# Patient Record
Sex: MALE | Race: WHITE
[De-identification: names, ages, dates, MRNs, and addresses within clinical notes are randomized per-mention and may not be internally consistent; named-entity substitution may affect disease eponyms.]

---

## 2020-12-02 NOTE — HISTORY AND PHYSICAL
DATE OF SERVICE:  



ADMISSION HISTORY AND PHYSICAL



This will be for outpatient surgery on 12/09/2020 for left knee arthroscopy.



HISTORY OF PRESENT ILLNESS:

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  He fell on

his flexed knee.  He has had grinding, popping and catching in his knee since

the time of his fall as well as pain and swelling due to functional impairment

and failure to improve with conservative measures.  The patient elected to

proceed with surgical intervention.



REVIEW OF SYSTEMS:

No chest pain, no shortness of breath, no dysuria.



PAST MEDICAL HISTORY:

Lumbar spine, depression, kidney stones, cardiomyopathy, obstructive sleep

apnea, neuropathy.



PAST SURGICAL HISTORY:

ORIF left elbow, gastric bypass, kidney stone removal, cholecystectomy.



FAMILY HISTORY:

Significant for hypertension, congestive heart failure, COPD, Crohn disease,

rheumatoid arthritis, diabetes.



PRIMARY CARE PROVIDER:

[facility].



MEDICATIONS:

Lisinopril, furosemide, Cymbalta, pantoprazole, Vraylar, hydrocodone, Celebrex,

Lasix, Protonix, amitriptyline, Neurontin.



ALLERGIES:

____.



SOCIAL HISTORY:

The patient denies alcohol, tobacco use.



RADIOGRAPHS:

Reveal some diffuse joint space narrowing, patellofemoral joint space narrowing

as well.



PHYSICAL EXAMINATION:

GENERAL:  The patient is well-developed, well-nourished, in no acute distress.

HEENT:  Normocephalic, atraumatic.  Pupils are equal, round, reactive to light. 

Oropharynx is clear.

NECK:  Supple, no lymphadenopathy.

LUNGS:  Clear to auscultation bilaterally.

HEART:  Regular rate and rhythm.

ABDOMEN:  Soft, nontender, nondistended.

EXTREMITIES:  The left knee demonstrates mild effusion.  He has pain with

patellar loading, tenderness along his posterior joint line with flexion. 

Negative anterior and posterior drawer.  No varus valgus laxity.  He is tender

along his medial joint lines as well.  Crepitus noted with patellar loading.



IMPRESSION:

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



PLAN:

Left knee arthroscopy, chondroplasty and partial meniscectomy.  Risks, benefits,

options, ramifications and recovery have been discussed at length with the

patient.  He understands and wishes to proceed.





Job ID: [id number]

DocumentID: [id number]

Dictated Date:  11/30/2020 12:28:52

Transcription Date: 11/30/2020 12:52:36

Dictated By: GRISEL SILVA MD

## 2020-12-07 ENCOUNTER — HOSPITAL ENCOUNTER (OUTPATIENT)
Dept: HOSPITAL 75 - PREOP | Age: 50
Discharge: HOME | End: 2020-12-07
Attending: ORTHOPAEDIC SURGERY
Payer: COMMERCIAL

## 2020-12-07 VITALS — HEIGHT: 77.95 IN | BODY MASS INDEX: 36.45 KG/M2 | WEIGHT: 315 LBS

## 2020-12-07 DIAGNOSIS — Z20.828: ICD-10-CM

## 2020-12-07 DIAGNOSIS — Z01.812: Primary | ICD-10-CM

## 2020-12-07 DIAGNOSIS — S83.242A: ICD-10-CM

## 2020-12-07 PROCEDURE — 87635 SARS-COV-2 COVID-19 AMP PRB: CPT

## 2020-12-09 ENCOUNTER — HOSPITAL ENCOUNTER (OUTPATIENT)
Dept: HOSPITAL 75 - SDC | Age: 50
End: 2020-12-09
Attending: ORTHOPAEDIC SURGERY
Payer: COMMERCIAL

## 2020-12-09 VITALS — DIASTOLIC BLOOD PRESSURE: 81 MMHG | SYSTOLIC BLOOD PRESSURE: 129 MMHG

## 2020-12-09 VITALS — SYSTOLIC BLOOD PRESSURE: 129 MMHG | DIASTOLIC BLOOD PRESSURE: 79 MMHG

## 2020-12-09 VITALS — DIASTOLIC BLOOD PRESSURE: 86 MMHG | SYSTOLIC BLOOD PRESSURE: 133 MMHG

## 2020-12-09 VITALS — SYSTOLIC BLOOD PRESSURE: 135 MMHG | DIASTOLIC BLOOD PRESSURE: 86 MMHG

## 2020-12-09 VITALS — WEIGHT: 315 LBS | BODY MASS INDEX: 36.45 KG/M2 | HEIGHT: 77.95 IN

## 2020-12-09 VITALS — SYSTOLIC BLOOD PRESSURE: 136 MMHG | DIASTOLIC BLOOD PRESSURE: 79 MMHG

## 2020-12-09 VITALS — SYSTOLIC BLOOD PRESSURE: 135 MMHG | DIASTOLIC BLOOD PRESSURE: 74 MMHG

## 2020-12-09 VITALS — DIASTOLIC BLOOD PRESSURE: 86 MMHG | SYSTOLIC BLOOD PRESSURE: 134 MMHG

## 2020-12-09 VITALS — SYSTOLIC BLOOD PRESSURE: 135 MMHG | DIASTOLIC BLOOD PRESSURE: 80 MMHG

## 2020-12-09 VITALS — DIASTOLIC BLOOD PRESSURE: 79 MMHG | SYSTOLIC BLOOD PRESSURE: 136 MMHG

## 2020-12-09 VITALS — DIASTOLIC BLOOD PRESSURE: 86 MMHG | SYSTOLIC BLOOD PRESSURE: 131 MMHG

## 2020-12-09 VITALS — DIASTOLIC BLOOD PRESSURE: 83 MMHG | SYSTOLIC BLOOD PRESSURE: 141 MMHG

## 2020-12-09 DIAGNOSIS — F32.9: ICD-10-CM

## 2020-12-09 DIAGNOSIS — G47.33: ICD-10-CM

## 2020-12-09 DIAGNOSIS — S83.242A: Primary | ICD-10-CM

## 2020-12-09 DIAGNOSIS — M06.9: ICD-10-CM

## 2020-12-09 DIAGNOSIS — Z79.899: ICD-10-CM

## 2020-12-09 DIAGNOSIS — Z88.8: ICD-10-CM

## 2020-12-09 DIAGNOSIS — I10: ICD-10-CM

## 2020-12-09 DIAGNOSIS — G62.9: ICD-10-CM

## 2020-12-09 DIAGNOSIS — M94.262: ICD-10-CM

## 2020-12-09 PROCEDURE — 87081 CULTURE SCREEN ONLY: CPT

## 2020-12-09 NOTE — PHYSICAL THERAPY ORTHO EVAL
PT Orthopedic Evaluation


Type of Surgery


Knee Scope


left





Prior Level of Function


Current Living Status:  Spouse


Locomotion     (Upon Admit):  Independent


Established Durable Medical Eq:  Crutches





Subjective


Subjective


Patient in bed pre tx, agrees to PT, has no complaints of pain.  No numbness in 

left leg


Entry Into Home:  Stairs With Railing





Motor Control


Motor Control:  Motor Control WNL





ROM


left knee extensin +2 degrees, flexion 90 degrees





Transfer


SCALE: Activities may be completed with or without assistive devices.





6-Indepedent-patient completes the activity by him/herself with no assistance 

from a helper.


5-Set-up or Clean-up Assistance-helper sets up or cleans up; patient completes 

activity. [place] assists only prior to or  


    following the activity.


4-Supervision or Touching Assistance-helper provides verbal cues and/or 

touching/steadying and/or contact guard assistance as patient completes 

activity. Assistance may be provided   


    throughout the activity or intermittently.


3-Partial/Moderate Assistance-helper does LESS THAN HALF the effort. [place] 

lifts, holds or supports trunk or limbs, but provides less than half the effort.


2-Substantial/Maximal Assistance-helper does MORE THAN HALF the effort. [place] 

lifts or holds trunk or limbs and provides more than half the effort.


8-Cvhodzoyc-ugtxqm does ALL the effort. Patient does none of the effort to 

complete the activity. Or, the assistance of 2 or more helpers is required for 

the patient to complete the  


    activity.


If activity was not attempted, code reason:


7-Patient Refused.


9-Not Applicable-not attempted and the patient did not perform the activity 

before the current illness, exacerbation or injury.


10-Not Attempted due to Environmental Limitations-(lack of equipment, weather 

restraints, etc.).


88-Not Attempted due to Medical Conditions or Safety Concerns.


Transfers (B, C, W/C) (QC):  4





Gait


Summary/Comments


Patient ambulated 125' with a rolling walker with SBA, went up and down 1 step 

using a rolling walker with CGA, cues for foot placement.  Patient has steady 

ambulation





Treatment Rendered


Treatment:  Therapeutic Exercises, Gait Train, Step Train


Exercise Instruction:  Quad Sets, Heel Slides, Ankle Pumps





Assessment/Goals


Goal Time Frame:  1 Visit


Understands HEP:  Yes


Safe Ambulation:  Yes





Plan


Treatment Plan:  Discharge


PT/Family Agrees to Plan:  Yes





Time


Time In:  1155


Time Out:  1206


Total Billed Treatment Time:  11


Billed Treatment Time


1 visit


EVL 11'











JACKIE QUEZADA PT                 Dec 9, 2020 13:16

## 2020-12-09 NOTE — PROGRESS NOTE-PRE OPERATIVE
Pre-Operative Progress Note


H&P Reviewed


The H&P was reviewed, patient examined and no changes noted.


Date Seen by Provider:  Dec 9, 2020


Time Seen by Provider:  07:46


Date H&P Reviewed:  Dec 9, 2020


Time H&P Reviewed:  07:46


Pre-Operative Diagnosis:  left knee medial meniscus tear and chondromalacia











GRISEL SILVA MD             Dec 9, 2020 07:47

## 2020-12-09 NOTE — ANESTHESIA-GENERAL POST-OP
General


Patient Condition


Mental Status/LOC:  Same as Preop


Cardiovascular:  Satisfactory


Nausea/Vomiting:  Absent


Respiratory:  Satisfactory


Pain:  Controlled


Complications:  Absent





Post Op Complications


Complications


None





Follow Up Care/Instructions


Patient Instructions


None needed.





Anesthesia/Patient Condition


Patient Condition


Patient is doing well, no complaints, stable vital signs, no apparent adverse 

anesthesia problems.











KARSTEN DELATORRE DO          Dec 9, 2020 10:49

## 2020-12-09 NOTE — PROGRESS NOTE-POST OPERATIVE
Post-Operative Progess Note


Surgeon (s)/Assistant (s)


Surgeon


GRISEL SILVA MD


Assistant:  Tom Simon





Pre-Operative Diagnosis


left knee medial meniscus tear and chondromalacia





Post-Operative Diagnosis





left knee medial meniscus tear and chondromalacia of the medial and


lateral femoral condyles and trochlea





Procedure & Operative Findings


Date of Procedure


12/9/20


Procedure Performed/Findings


left knee arthroscopic partial medial meniscectomy and chondroplasty of the medi

al and lateral femoral condyles and trochlea


Anesthesia Type


GETA





Estimated Blood Loss


Estimated blood loss (mL):  minimal





Specimens/Packing


Specimens Removed


none


Packing:  


none











GRISEL SILVA MD             Dec 9, 2020 07:48

## 2020-12-09 NOTE — OPERATIVE REPORT
DATE OF SERVICE:  



PREOPERATIVE DIAGNOSES:

1.  Left knee medial meniscus tear.

2.  Left knee chondromalacia of the medial femoral condyle.



POSTOPERATIVE DIAGNOSES:

1.  Left knee medial meniscus tear.

2.  Left knee chondromalacia of the medial femoral condyle.

3.  Left knee chondromalacia of the lateral femoral condyle.

4.  Left knee chondromalacia of the trochlea.



PROCEDURES PERFORMED:

1.  Left knee arthroscopic partial medial meniscectomy.

2.  Left knee arthroscopic chondroplasty of the medial femoral condyle.

3.  Left knee arthroscopic chondroplasty of the lateral femoral condyle.

4.  Left knee arthroscopic chondroplasty of the trochlea.



SURGEON:

Andrew Silva MD.



ASSISTANT:

Tom Simon, who assisted throughout the procedure and closed the incisions.



ANESTHESIA:

General endotracheal by Dr. Timmons.



TOURNIQUET TIME:

Not applicable.



ESTIMATED BLOOD LOSS:

Minimal.



DRAINS:

None.



COMPLICATIONS:

None.



POSTOPERATIVE PLAN:

Routine arthroscopy protocol.  The patient was transferred to the recovery room

awake and in stable condition.



STATEMENT OF MEDICAL NECESSITY:

The patient is a 50-year-old gentleman with complaints of left medial knee pain,

catching, locking and swelling.  He had tenderness along the medial joint line

and pain medially with Vitaliy's.  He also had some joint space narrowing noted

radiographically.  Due to functional impairment and failure to improve with

conservative measures, the patient elected to proceed with surgical

intervention.  Examination under anesthesia revealed range of motion 0/0/130

with negative Lachman, negative anterior and posterior drawer.  No varus or

valgus laxity, negative pivot shift.  Arthroscopic findings of the patella

demonstrated grade IV chondral loss centrally in a 15 x 20 area with no unstable

chondral flaps were noted otherwise.  The trochlea demonstrated grade II

chondral flap centrally in a 10 x 10 area.  The medial and lateral gutters were

clear.  The ACL and PCL were intact.  The lateral compartment demonstrated grade

II to III chondral flaps of the central portion of the femoral condyle in a 10 x

12 area.  The medial compartment demonstrated a flap tear of the posterior

horn/body junction involving approximately 20% of the junction of the meniscus

and there were grade II to III chondral flaps of the central portion of the

femoral condyle in a 10 x 12 area.



PROCEDURE IN DETAIL:

After the risks and benefits of the procedure were discussed and questions were

answered, informed consent was signed and placed on the chart, the operative

site was confirmed in the preoperative holding area initialed by the surgeon. 

The patient was then transferred to the operating room and after adequate levels

of general endotracheal anesthetic were obtained, a timeout was called,

confirming the operative site.  Examination under anesthesia was performed with

the above findings noted.  The left lower extremity was prepped and draped in

the usual sterile fashion.  The knee joint was injected with 60 mL of fluid and

a standard inferolateral portal placed with the arthroscope under direct

visualization, inferior medial portal was created.  The menisci and cruciates

were carefully probed with the above findings noted.  The unstable chondral

flaps on the trochlea were debrided with a shaver back to a stable edge.  Scope

was redirected into the lateral compartment and unstable chondral flaps on the

lateral femoral condyle were debrided with the shaver back to a stable edge. 

Scope was redirected into the medial compartment and the unstable chondral flaps

in the medial femoral condyle were debrided with shaver back to a stable edge

and the medial meniscus flap tear was debrided with a shaver back to a stable

edge.  This was carefully probed with no further tearing or instability noted. 

The knee was copiously irrigated.  The port sites were closed with 4-0 nylon in

a simple interrupted fashion.  The knee was injected with Duramorph.  The port

sites were infiltrated with plain Marcaine.  A soft dressing was applied and the

patient was transferred to the recovery room awake and in a stable condition.





Job ID: [id number]

DocumentID: [id number]

Dictated Date:  12/09/2020 10:28:38

Transcription Date: 12/09/2020 18:05:32

Dictated By: ANDREW SILVA MD

## 2021-01-13 ENCOUNTER — HOSPITAL ENCOUNTER (OUTPATIENT)
Dept: HOSPITAL 75 - PREOP | Age: 51
LOS: 90 days | Discharge: HOME | End: 2021-04-13
Attending: ORTHOPAEDIC SURGERY
Payer: COMMERCIAL

## 2021-01-13 DIAGNOSIS — Z01.818: Primary | ICD-10-CM

## 2021-10-27 ENCOUNTER — HOSPITAL ENCOUNTER (EMERGENCY)
Dept: HOSPITAL 75 - ER | Age: 51
Discharge: HOME | End: 2021-10-27
Payer: COMMERCIAL

## 2021-10-27 VITALS — BODY MASS INDEX: 37.58 KG/M2 | WEIGHT: 315 LBS | HEIGHT: 76.77 IN

## 2021-10-27 VITALS — SYSTOLIC BLOOD PRESSURE: 141 MMHG | DIASTOLIC BLOOD PRESSURE: 80 MMHG

## 2021-10-27 DIAGNOSIS — Z79.899: ICD-10-CM

## 2021-10-27 DIAGNOSIS — F32.9: ICD-10-CM

## 2021-10-27 DIAGNOSIS — M54.9: ICD-10-CM

## 2021-10-27 DIAGNOSIS — G89.29: ICD-10-CM

## 2021-10-27 DIAGNOSIS — Z79.891: ICD-10-CM

## 2021-10-27 DIAGNOSIS — M23.91: Primary | ICD-10-CM

## 2021-10-27 PROCEDURE — 73562 X-RAY EXAM OF KNEE 3: CPT

## 2021-10-27 NOTE — XMS REPORT
Encounter Summary

                             Created on: 10/27/2021



Silas Bocanegrajohn Michael

External Reference #: [id number]

: 1970

Sex: Male



Demographics





                          Address                   [address] [place]  [address]

 

                          Home Phone                [phone]

 

                          Preferred Language        English

 

                          Marital Status            

 

                          Confucianism Affiliation     Unknown

 

                          Race                      White

 

                          Ethnic Group              Not  or 





Author





                          Author                    [facility]

 

                          Organization              [facility]

 

                          Address                   Unknown

 

                          Phone                     Unavailable







Support





                Name            Relationship    Address         Phone

 

                    Natalie Bocanegra  [employer]                [address] [place]  [address]                       [phone]







Care Team Providers





                    Care Team Member Name Role                Phone

 

                    Mary Hong Unavailable         [phone]

 

                    Ana Paula Lockwood RN Unavailable         Unavailable

 

                    Nura Mosqueda MD Unavailable         [phone]

 

                    Herbert Medeiros MD    Unavailable         Unavailable

 

                    Olivier Gomez PA-C PCP                 [phone]







Reason for Referral

* Pain Authorization (Routine)



                          Referred By Contact       Referred To Contact



                 Status          Reason          Specialty       Diagnoses /  



                                         Procedures  

 

                                        



Hue Tian APRN-NP



[address]



[address]



[place] [address]



Phone: [phone]



Fax: [phone]                       



[facility]



[address]



[place] [address]



Phone: [phone]



                     Authorized          Anesthesia Pain     Diagnoses  



                                         Lumbar  



                                         radiculopathy

  



                                         P  



                                         rocedures  



                                         [facility]  



                                         INJECT SNRB/TFESI  



                                         LUMBAR/SACRAL  









Electronically signed by Hue SANDERS at 





Reason for Visit

* 



 



                           Reason                    Comments

 

 



                           Follow Up                 fuv on low back pain









Encounter Details





                          Care Team                 Description



                     Date                Type                Department  

 

                                        



Hue Tian APRN-NP



[address]



[address]



[place] [address]



[phone] (Fax)                      Lumbar radiculopathy (Primary Dx); 

Chronic right-sided low back pain without sciatica; 

Medication management; 

S/P epidural steroid injection



                     2021          Office Visit        [facility]  



                                         Med: [facility]  



                                         [facility]: [id number]  



                                         [address]  



                                         [address]  



                                         [place]  



                                         [id number] [phone]  







Social History





                                        Date



                 Tobacco Use     Types           Packs/Day       Years Used 

 

                                         



                     Never Smoker        0                   0 

 

    



                           Smokeless Tobacco:        Chew  



                                         Current User   







                                        Comments: current use of smokless tobacc

o (chew)







                                        Comments



                           Alcohol Use               Standard Drinks/Week 

 

                                         



                           Yes                       8 (1 standard drink = 0.6 o

z pure alcohol) 







  



                     Alcohol Habits      Answer              Date Recorded

 

  



                           How often do you have a drink containing alcohol?  No

t asked 

 

  



                           How many drinks containing alcohol do you have on  No

t asked 



                                         a typical day when you are drinking?  

 

  



                     How often do you have six or more drinks on one  Weekly    

          2020



                                         occasion?  

 

  



                           Comment:                  Not asked 







 



                           Sex Assigned at Birth     Date Recorded

 

 



                           Male                      2020  3:37 PM CDT







                                        Date Recorded



                           COVID-19 Exposure         Response 

 

                                        2021 12:02 PM CDT



                           In the last month, have you been in contact with  No 

/ Unsure 



                                         someone who was confirmed or suspected 

to have  



                                         Coronavirus / COVID-19?  



documented as of this encounter



Last Filed Vital Signs





                    Reading             Time Taken          Comments



                                         Vital Sign   

 

                    128/61              2021 10:15 AM CDT  



                                         Blood Pressure   

 

                    66                  2021 10:15 AM CDT  



                                         Pulse   

 

                    36.7 C (98.1 F) 2021 10:15 AM CDT  



                                         Temperature   

 

                    22                  2021 10:15 AM CDT  



                                         Respiratory Rate   

 

                    100%                2021 10:15 AM CDT  



                                         Oxygen Saturation   

 

                    -                   -                    



                                         Inhaled Oxygen   



                                         Concentration   

 

                    194.6 kg (429 lb)   2021 10:15 AM CDT  



                                         Weight   

 

                    198.1 cm (6' 6")    2021 10:15 AM CDT  



                                         Height   

 

                    49.58               2021 10:15 AM CDT  



                                         Body Mass Index   



documented in this encounter



Functional Status





                                        Date of Assessment



                           Functional Status         Response 

 

                                        2021



                           Does the patient have a hearing impairment:  No 

 

                                        2021



                           Does the patient have a visual impairment:  Yes 

 

                                        2021



                           Does the patient have impaired ambulation:  Yes 

 

                                        2021



                           Does the patient have an activity of daily living  No

 



                                         (ADL) impairment:  

 

                                        2021



                           Does the patient have an instrumental activity of  Ye

s 



                                         daily living (IADL) impairment:  







                                        Date of Assessment



                           Cognitive Status          Response 

 

                                        2021



                           Does the patient have a cognitive impairment:  No 



documented as of this encounter



Ordered Prescriptions





                          Start Date                End Date



                 Prescription    Sig             Dispensed       Refills  

 

                          2021                 



                 HYDROcodone/acetaminophen  Take one        45 tablet       0  



                           (NORCO) 10/325 mg tablet  tablet by    



                                         mouth every 6    



                                         hours as    



                                         needed for    



                                         Pain    

 

                          10/26/2021                10/18/2021



                 HYDROcodone/acetaminophen  Take one        30 tablet       0  



                           (NORCO) 10/325 mg tablet  tablet by    



                                         mouth every 6    



                                         hours as    



                                         needed for    



                                         Pain    

 

                          2021                10/18/2021



                 HYDROcodone/acetaminophen  Take one        30 tablet       0  



                           (NORCO) 10/325 mg tablet  tablet by    



                                         mouth every 6    



                                         hours as    



                                         needed for    



                                         Pain    



documented in this encounter



Progress Notes

* Hue Tian, APRN-NP - 2021 10:00 AM CDT



Formatting of this note is different from the original.



[facility] CL[facility] NOTE

 



SUBJECTIVE: 

Onel Bocanegra is a 51 y.o.-year-old male with history of cardiomyopat
hy and L2-L3 disc extrusion, who presents for scheduled follow up for right-side
d radicular low back pain.  The patient was last seen via telehealth on . At that time, he was provided prescription refills for hydrocodone for 3 chavo
hs as well as recommend continuing with walking program and home exercises.  Ana Maria quesada has previously underwent a right L3 transforaminal epidural steroid injecti
on on 2021.  Patient reports greater than 80% reduction in his pain that la
sted greater than 3 months.  Pain is now back to preprocedural level.  Patient i
s interested in repeating the injection at this time.  He has seen Dr. Rahul aparicio
n  for follow-up visit.  Patient is scheduled to have an EMG completed in Se
ptember.  He still needs to lose additional weight to be considered a surgical c
andidate.  He is scheduled to meet with a nutritionist at his work coming up mandi
n.  He continues to take hydrocodone with functional benefits.  He denies any ad
verse effects.  VAS pain score is rated 7 out of 10 today.  Patient does report 
intermittent right knee buckling with stairs.  He is not currently wearing a bra
ce.  He denies any falls but has had some near misses.  Denies any new overt wea
knesses in the lower extremities.  Denies any loss of control of bowel or bladde
r.  Last MRI lumbar spine was completed in 2020.

 



Review of Systems



Current Outpatient Medications: 

  amitriptyline (ELAVIL) 50 mg tablet, Take  by mouth at bedtime daily., Disp
: , Rfl: 

  celecoxib (CELEBREX) 100 mg capsule, TAKE ONE (1) CAPSULE BY MOUTH TWICE DA
GUILLERMO , Disp: 180 capsule, Rfl: 0

  duloxetine DR (CYMBALTA) 30 mg capsule, Take 30 mg by mouth twice daily., D
isp: , Rfl: 

  furosemide (LASIX) 40 mg tablet, Take 40 mg by mouth daily., Disp: , Rfl: 

  HYDROcodone/acetaminophen (NORCO) 10/325 mg tablet, Take one tablet by mout
h every 6 hours as needed for Pain, Disp: 45 tablet, Rfl: 0

  [START ON 2021] HYDROcodone/acetaminophen (NORCO) 10/325 mg tablet, Ta
ke one tablet by mouth every 6 hours as needed for Pain, Disp: 30 tablet, Rfl: 0

  [START ON 10/26/2021] HYDROcodone/acetaminophen (NORCO) 10/325 mg tablet, T
yaneth one tablet by mouth every 6 hours as needed for Pain, Disp: 30 tablet, Rfl: 
0

  lisinopriL (ZESTRIL) 10 mg tablet, Take  by mouth daily., Disp: , Rfl: 

  pantoprazole DR (PROTONIX) 40 mg tablet, Take 40 mg by mouth daily., Disp: 
, Rfl: 

  pregabalin (LYRICA) 75 mg capsule, , Disp: , Rfl: 

  tiZANidine (ZANAFLEX) 4 mg tablet, Take one-half tablet to one tablet by mo
uth twice daily as needed., Disp: 90 tablet, Rfl: 3

  Verification of Patch Placement and Integrity - Lidocaine 5%, , Disp: , Rfl
: 

  vilazodone (VIIBRYD) 20 mg tablet, Take 40 mg by mouth daily., Disp: , Rfl:


  VRAYLAR 3 mg cap, 1.5 mg. 1.5 MG, Disp: , Rfl: 

Allergies 

Allergen Reactions 

 Carvedilol SEE COMMENTS 



Physical Exam

Vitals: 

 21 1015 

BP: 128/61 

BP Source: Arm, Right Upper 

Patient Position: Sitting 

Pulse: 66 

Resp: 22 

Temp: 36.7 C (98.1 F) 

TempSrc: Oral 

SpO2: 100% 

Weight: (!) 194.6 kg (429 lb) 

Height: 198.1 cm (78") 

PainSc: Seven 



Oswestry Total Score:: 52

Pain Score: Seven

Body mass index is 49.58 kg/m.

General: 51 y.o. male appears stated age, in no acute distress

HEENT: Normocephalic, atraumatic

Neck: No thyroidmegaly

Cardiovascular: Well perfused

Pulmonary: Unlabored respirations

Extremities: No cyanosis, clubbing, or edema

Skin: Warm and dry

Psychiatric:  Appropriate mood and affect

Musculoskeletal: Decreased range of motion with lumbar flexion, extension, and l
ateral rotation.  Tender to palpation at lower lumbar facets on the right. Facet
loading is positive bilaterally. 

Neurologic: Weakness with right knee extension, ankle dorsiflexion, and EHL 3/5 
otherwise, lower extremity myotomes are all 5/5.  Decreased sensation right L4-S
1 dermatomal distribution otherwise, lower extremity dermatomes are all intact t
o light touch.  Deep tendon reflexes are symmetric at patella and achilles.   Do
wnward Babinski.  No ankle clonus. 

 



IMPRESSION:

1. Lumbar radiculopathy  

2. Chronic right-sided low back pain without sciatica  

3. Medication management  

4. S/P epidural steroid injection  



PLAN:  

1.  Lifestyle modifications.  Recommend activity as tolerated.  Avoid provocativ
e maneuvers.  Keep spine in neutral position. Continue with weight loss efforts.
Patient is scheduled to see nutrition thru his work. He is consider seeing a ba
[facility] surgeon. 

2.  Medications.   Patient provided prescription for 3 months of Hydrocodone.  P
atient provided with 1 additional month of 45 tabs and then decreased back to 30
tabs per month for the remaining 2 months. Medication usage and safety reviewed
.I have counseled him to take the minimally effective dose, avoid taking it sc
heduled and chronically, and to only utilize when he is having severe pain.

3.  Therapy.  Continue with home exercises. Discussed hinged knee brace. Patient
will contact us if he would like a prescription. 

4.  Imaging/Diagnostics. Ordered UDS today. 

5.  Interventions. Recommend repeating right L3 TFESI.  Patient has previously u
nderwent injection with greater than 80% relief for more than 3 months.  He is a
ble to take less opioid pain medication after injections.  Discussed risks of th
e procedure including pain, bleeding, infection, and damage to nearby structures
.  Patient has elected to proceed with injection at this time.

6.  Follow-up.  Patient to follow-up for injection and in 3 months for medicatio
n refills.



ADDENDUM: UDS from 2021 demonstrated metabolites of hydrocodone, hydromorph
one, and nor hydrocodone, which are all metabolites of hydrocodone and consisten
t with current medication prescriptions.  There is also an metabolites of ethano
l including ethyl glucuronide and ethyl sulfate.



 



Electronically signed by Bel Ludwig MD at 10/05/2021  8:58 AM CDT

documented in this encounter



Plan of Treatment





                                        Order Schedule



                 Name            Type            Priority        Associated Diag

noses 

 

                                        2 Occurrences starting 2021 until 

2022, 1 completed



                 [facility] INJECT  Procedures      Routine         Lumbar rad

iculopathy 



                                         SNRB/TFESI LUMBAR/SACRAL    

 

                                        Expected: 2021 (Approximate), Expi

res: 2022



                 AMPHETAMINES-URINE RANDOM  Lab             Routine         Lumb

ar radiculopathy 



                                         Medication management 

 

                                        Expected: 2021 (Approximate), Expi

res: 2022



                 BARBITURATES-URINE RANDOM  Lab             Routine         Medi

cation management 

 

                                        Expected: 2021 (Approximate), Expi

res: 2022



                 BENZODIAZEPINES-URINE  Lab             Routine         Medicati

on management 



                                         RANDOM    

 

                                        Expected: 2021 (Approximate), Expi

res: 2022



                 CANNABINOIDS-URINE RANDOM  Lab             Routine         Medi

cation management 

 

                                        Expected: 2021 (Approximate), Expi

res: 2022



                 COCAINE-URINE RANDOM  Lab             Routine         Medicatio

n management 

 

                                        Expected: 2021 (Approximate), Expi

res: 2022



                 PHENCYCLIDINES-URINE  Lab             Routine         Medicatio

n management 



                                         RANDOM    

 

                                        Expected: 2021 (Approximate), Expi

res: 2022



                 METHADONE-URINE SCREEN  Lab             Routine         Medicat

ion management 

 

                                        Expected: 2021 (Approximate), Expi

res: 2022



                 OXYCODONE URINE SCREEN  Lab             Routine         Medicat

ion management 

 

                                        Expected: 2021 (Approximate), Expi

res: 2022



                 OPIATES 300 OR  Lab             Routine         Medication mercy

gement 



                                         GREATER-URINE RANDOM    



documented as of this encounter



Results

* Selective Nerve Rootblock/Transforaminal Lumbar/Sacral (2021 12:03 PM 
  CDT)



 



                           Narrative                 Performed At

 

 



                           Bel Ludwig MD   2021 3:26 PM  OTHER

 OUTSIDE LAB



                                         Selective Nerve Rootblock/Transforamina

l Lumbar/Sacral 



                                         Procedure: transforaminal epidural 



                                         Laterality: right 



                                         on 2021 12:03 PM 



                                         Location: lumbar - L4-5 



                                         Consent: 



                                         Consent obtained: verbal and written 



                                         Consent given by: patient 



                                         Risks discussed: allergic reaction, ble

eding, bruising, infection, nerve 



                                         damage, no change or worsening in pain 

and reaction to medication 



                                         Discussed with patient the purpose of t

he treatment/procedure, other ways 



                                         of treating my condition, including no 

treatment/ procedure and the risks 



                                         and benefits of the alternatives. Patie

nt has decided to proceed with 



                                         treatment/procedure.   



                                         Universal Protocol: 



                                         Relevant documents: relevant documents 

present and verified 



                                         Test results: test results available an

d properly labeled 



                                         Imaging studies: imaging studies availa

ble 



                                         Required items: required blood products

, implants, devices, and special 



                                         equipment available 



                                         Site marked: the operative site was mar

ked 



                                         Patient identity confirmed: Patient taiwo

ntify confirmed verbally with 



                                         patient.   



                                         Time out: Immediately prior to procedur

e a "time out" was called to verify 



                                         the correct patient, procedure, equipme

nt, support staff and site/side 



                                         marked as required  



                                         Procedures Details: 



                                         Indications: pain and diagnostic evalua

tion 



                                         Prep: chlorhexidine 



                                         Patient position: prone 



                                         Estimated Blood Loss: minimal 



                                         Specimens: none 



                                         Number of Joints: 1 



                                         Guidance: fluoroscopy 



                                         Contrast: Procedure confirmed with cont

rast under live fluoroscopy. 



                                         Needle and Epidural Catheter: quincke 



                                         Needle size: 25 G 



                                         Injection procedure: Incremental inject

ion 



                                         Patient tolerance: Patient tolerated th

e procedure well with no immediate 



                                         complications. Pressure was applied, an

d hemostasis was accomplished. 



                                         Comments: DESCRIPTION OF PROCEDURE: T

he procedure risks and benefits were 



                                         explained to the patient. Informed co

nsent was obtained. The patient was 



                                         placed in the prone position on the flu

oroscopy table with a pillow under 



                                         the abdomen to help reduce lumbar lordo

sis. Blood pressure cuff and 



                                         oxygen saturation monitor were attached

 and the patient was monitored 



                                         throughout the entire procedure. The 

L3 vertebral body was identified 



                                         with the use of fluoroscopy in the AP v

iew; the C-arm was obliqued to 



                                         obtain a Ortiz view. The right L

3 pedicle was visualized. The skin 



                                         was prepped using Chlorhexadine and kasia

ped in aseptic fashion. The C-arm 



                                         was rotated slightly obliquely towards 

the left side to visualize the area 



                                         just below the foramen. Skin and subc

utaneous tissue were anesthetized 



                                         using 3 mL of 1 percent lidocaine with 

a 27-gauge, 1-1/2 inch needle.  



                                         Next, a 4.69-inch, 25-gauge spinal need

le was slowly advanced to the 6 



                                         o'clock position to the right of the L3

 pedicle just cephalad to the 



                                         superior articular process. The latte

r part of the needle advancement was 



                                         performed with the C-arm in the lateral

 view. When the needle tip was 



                                         visualized to be in the right L3 neural

 foramen, the C-arm was changed 



                                         back to AP view. Then, 0.6 mL of cont

rast dye was injected. There was 



                                         spread of dye revealing right L4 nerve 

root. After negative aspiration, a 



                                         2 mL solution containing 7.5 mg of dexa

methasone and 0.5 mL of 1 percent 



                                         lidocaine was injected in increments. T

he stylet was reinserted and then 



                                         removed. After the procedure, the patie

nt's blood pressure, heart rate, 



                                         oxygen saturation, and VAS pain score w

ere recorded in the chart.   



                                         There were no complications. The saji

ent tolerated the procedure well and 



                                         was brought to the room for observation

 in stable condition and discharged 



                                         with written discharge instructions. 



                                         PLAN OF CARE: The patient is to follo

w up in the interventional spine 



                                         clinic in 3 weeks. 



                                         The patient was advised to contact the 

interventional spine center for any 



                                         of the following:  



                                         Fever, chills, or night sweats. 



                                         New onset severe sharp pain. 



                                         Any new upper or lower extremity weakne

ss or numbness. 



                                         Any questions regarding the procedure. 



                                         If unable to contact the interventional

 spine center, the patient was 



                                         instructed to go to the local emergency

 room. 







   



                 Performing Organization  Address         City/State/ZIP Code  P

melvin Number

 

   



                                         OTHER OUTSIDE LAB   





documented in this encounter



Visit Diagnoses









                                         Diagnosis

 





                                         Lumbar radiculopathy - Primary



                                         Thoracic or lumbosacral neuritis or rad

iculitis, unspecified

 





                                         Chronic right-sided low back pain witho

ut sciatica

 





                                         Medication management



                                         Encounter for long-term (current) use o

f other medications

 





                                         S/P epidural steroid injection



documented in this encounter



Discontinued Medications





                          Start Date                End Date



                     Medication          Sig                 Discontinue  



                                         Reason  

 

                          2021



                           HYDROcodone/acetaminophen  Take one   



                           (NORCO) 10/325 mg tablet  tablet by   



                                         mouth every   



                                         12 hours as   



                                         needed for   



                                         Pain   

 

                          2021



                           HYDROcodone/acetaminophen  Take one   



                           (NORCO) 10/325 mg tablet  tablet by   



                                         mouth every   



                                         12 hours as   



                                         needed for   



                                         Pain   



documented as of this encounter



Additional Health Concerns





 



                           Assessment                Noted Time

 

 



                           A fall risk assessment has been completed for the pat

ient  2021 10:16 AM 

CDT



documented as of this encounter

## 2021-10-27 NOTE — XMS REPORT
Encounter Summary

                             Created on: 10/27/2021



Onel Bocanegra

External Reference #: [id number]

: 1970

Sex: Male



Demographics





                          Address                   [address] [place]  [address]

 

                          Home Phone                [phone]

 

                          Preferred Language        English

 

                          Marital Status            

 

                          Restorationist Affiliation     Unknown

 

                          Race                      White

 

                          Ethnic Group              Not  or 





Author





                          Author                    [facility]

 

                          Organization              [facility]

 

                          Address                   Unknown

 

                          Phone                     Unavailable







Support





                Name            Relationship    Address         Phone

 

                    Natalie Bocanegra  [employer]                [address] [place]  [address]                       [phone]







Care Team Providers





                    Care Team Member Name Role                Phone

 

                    Mary Hong Unavailable         [phone]

[phone]

 

                    Ana Paula Lockwood RN Unavailable         Unavailable

 

                    Nura Mosqueda MD Unavailable         [phone]

 

                    Herbert Medeiros MD    Unavailable         Unavailable

 

                    Olivier Gomez PA-C PCP                 [phone]







Encounter Details





                          Care Team                 Description



                     Date                Type                Department  

 

                                                     



                           2021                Travel   







Social History





                                        Date



                 Tobacco Use     Types           Packs/Day       Years Used 

 

                                         



                     Never Smoker        0                   0 

 

    



                           Smokeless Tobacco:        Chew  



                                         Current User   







                                        Comments: current use of smokless tobacc

o (chew)







                                        Comments



                           Alcohol Use               Standard Drinks/Week 

 

                                         



                           Yes                       8 (1 standard drink = 0.6 o

z pure alcohol) 







  



                     Alcohol Habits      Answer              Date Recorded

 

  



                           How often do you have a drink containing alcohol?  No

t asked 

 

  



                           How many drinks containing alcohol do you have on  No

t asked 



                                         a typical day when you are drinking?  

 

  



                     How often do you have six or more drinks on one  Weekly    

          2020



                                         occasion?  

 

  



                           Comment:                  Not asked 







 



                           Sex Assigned at Birth     Date Recorded

 

 



                           Male                      2018  3:37 PM CDT







                                        Date Recorded



                           COVID-19 Exposure         Response 

 

                                        2021 12:02 PM CDT



                           In the last month, have you been in contact with  No 

/ Unsure 



                                         someone who was confirmed or suspected 

to have  



                                         Coronavirus / COVID-19?  



documented as of this encounter



Functional Status





                                        Date of Assessment



                           Functional Status         Response 

 

                                        2021



                           Does the patient have a hearing impairment:  No 

 

                                        2021



                           Does the patient have a visual impairment:  Yes 

 

                                        2021



                           Does the patient have impaired ambulation:  Yes 

 

                                        2021



                           Does the patient have an activity of daily living  No

 



                                         (ADL) impairment:  

 

                                        2021



                           Does the patient have an instrumental activity of  Ye

s 



                                         daily living (IADL) impairment:  







                                        Date of Assessment



                           Cognitive Status          Response 

 

                                        2021



                           Does the patient have a cognitive impairment:  No 



documented as of this encounter



Plan of Treatment





Not on filedocumented as of this encounter



Visit Diagnoses

Not on filedocumented in this encounter



Additional Health Concerns





 



                           Assessment                Noted Time

 

 



                           A fall risk assessment has been completed for the pat

ient  2021 10:16 AM 

CDT



documented as of this encounter

## 2021-10-27 NOTE — XMS REPORT
Encounter Summary

                             Created on: 10/27/2021



Silas Bocanegrajohn Michael

External Reference #: [id number]

: 1970

Sex: Male



Demographics





                          Address                   [address] [place]  [address]

 

                          Home Phone                [phone]

 

                          Preferred Language        English

 

                          Marital Status            

 

                          Rastafari Affiliation     Unknown

 

                          Race                      White

 

                          Ethnic Group              Not  or 





Author





                          Author                    [facility]

 

                          Organization              [facility]

 

                          Address                   Unknown

 

                          Phone                     Unavailable







Support





                Name            Relationship    Address         Phone

 

                    Natalie Bocanegra  ECON                [address] [place]  [address]                       [phone]







Care Team Providers





                    Care Team Member Name Role                Phone

 

                    Mary Hong Unavailable         [phone]

 

                    Ana Paula Lockwood RN Unavailable         Unavailable

 

                    Nura Mosqueda MD Unavailable         [phone]

 

                    Herbert Medeiros MD    Unavailable         Unavailable

 

                    Olivier GomezC PCP                 [phone]







Reason for Referral

* Pain Authorization (Routine)



                          Referred By Contact       Referred To Contact



                 Status          Reason          Specialty       Diagnoses /  



                                         Procedures  

 

                                        



Hue Tian APRN-NP



[address]



[address]



[place] [address]



Phone: [phone]



Fax: [phone]                       







                           New Request               Diagnoses  



                                         Lumbar  



                                         radiculopathy

  



                                         P  



                                         rocedures  



                                         [facility]  



                                         INJECT SNRB/TFESI  



                                         LUMBAR/SACRAL  









Electronically signed by Hue SANDERS at 





Reason for Visit

* 



 



                           Reason                    Comments

 

 



                           Follow Up                 fuv after injection 21

-Right L3 TFESI, it was helpful









Encounter Details





                          Care Team                 Description



                     Date                Type                Department  

 

                                        



Hue Tian APRN-NP



[address]



[address]



[place], [place] [address] [phone] (Fax)                      S/P epidural steroid injection (Primary 

Dx); 

Lumbar radiculopathy; 

Medication management



                     10/18/2021          Office Visit        [facility]  



                           Telehealth                Med: [facility]  



                                         Pavilion: [id number]  



                                         [address]  



                                         [address]  



                                         [place]  



                                         [address] [phone]  







Social History





                                        Date



                 Tobacco Use     Types           Packs/Day       Years Used 

 

                                         



                     Never Smoker        0                   0 

 

    



                           Smokeless Tobacco:        Chew  



                                         Current User   







                                        Comments: current use of smokless tobacc

o (chew)







                                        Comments



                           Alcohol Use               Standard Drinks/Week 

 

                                         



                           Yes                       8 (1 standard drink = 0.6 o

z pure alcohol) 







  



                     Alcohol Habits      Answer              Date Recorded

 

  



                           How often do you have a drink containing alcohol?  No

t asked 

 

  



                           How many drinks containing alcohol do you have on  No

t asked 



                                         a typical day when you are drinking?  

 

  



                     How often do you have six or more drinks on one  Weekly    

          2020



                                         occasion?  

 

  



                           Comment:                  Not asked 







 



                           Sex Assigned at Birth     Date Recorded

 

 



                           Male                      2018  3:37 PM CDT







                                        Date Recorded



                           COVID-19 Exposure         Response 

 

                                        10/18/2021  2:45 PM CDT



                           In the last month, have you been in contact with  No 

/ Unsure 



                                         someone who was confirmed or suspected 

to have  



                                         Coronavirus / COVID-19?  



documented as of this encounter



Last Filed Vital Signs





                    Reading             Time Taken          Comments



                                         Vital Sign   

 

                    -                   -                    



                                         Blood Pressure   

 

                    -                   -                    



                                         Pulse   

 

                    -                   -                    



                                         Temperature   

 

                    -                   -                    



                                         Respiratory Rate   

 

                    -                   -                    



                                         Oxygen Saturation   

 

                    -                   -                    



                                         Inhaled Oxygen   



                                         Concentration   

 

                    194.6 kg (429 lb)   10/18/2021  2:43 PM CDT  



                                         Weight   

 

                    198.1 cm (6' 6")    10/18/2021  2:43 PM CDT  



                                         Height   

 

                    49.58               10/18/2021  2:43 PM CDT  



                                         Body Mass Index   



documented in this encounter



Functional Status





                                        Date of Assessment



                           Functional Status         Response 

 

                                        2021



                           Does the patient have a hearing impairment:  No 

 

                                        2021



                           Does the patient have a visual impairment:  Yes 

 

                                        2021



                           Does the patient have impaired ambulation:  Yes 

 

                                        2021



                           Does the patient have an activity of daily living  No

 



                                         (ADL) impairment:  

 

                                        2021



                           Does the patient have an instrumental activity of  Ye

s 



                                         daily living (IADL) impairment:  







                                        Date of Assessment



                           Cognitive Status          Response 

 

                                        2021



                           Does the patient have a cognitive impairment:  No 



documented as of this encounter



Ordered Prescriptions





                          Start Date                End Date



                 Prescription    Sig             Dispensed       Refills  

 

                          10/18/2021                 



                 HYDROcodone/acetaminophen  Take one        30 tablet       0  



                           (NORCO) 10/325 mg tablet  tablet by    



                                         mouth every    



                                         12 hours as    



                                         needed for    



                                         Pain    



documented in this encounter



Progress Notes

* Hue Tian APRN-CALISTA - 10/18/2021  2:40 PM CDT



Formatting of this note is different from the original.

Obtained patient's verbal consent to treat them and their agreement to [employer] fin
ancial policy and NPP via this telehealth visit during the [facility]



[facility] CLINIC NOTE

 



SUBJECTIVE: 

Onel Bocanegra is a 51 y.o.-year-old male with history of  cardiomyopa
thy and L2-L3 disc extrusion who presents for follow-up after right L3 transfora
joie injection on 2021. Patient reports significant relief with the inject
ion. Patient reports at least  75% reduction in pain. Overall, he is happy with 
his current level of pain. He is interested in having this repeated. Pain has no
t recurred. He continues to have pain in the right leg.  Patient states he recen
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  Patient states being on his feet for several hours of the day has increased
his pain in the legs.  Patient also reports falling several weeks ago and notic
ed increasing pain on the left low back and left leg.  Patient states he has not
previously had any symptoms on the left-hand side.  Patient continues to take h
ydrocodone on an as-needed basis.  Patient states some days he does not need to 
take hydrocodone.  He denies any adverse effects from the medications.  He jose luis
nues to have functional benefits while taking this medication.  He recently had 
an EMG of bilateral lower extremities completed by Dr. Diaz.  Pain is not a
t preprocedure level. VAS pain score is rated a 7/10. He has been continuing wit
h home exercise program.  He able to complete all normal daily activities and fe
els as though his pain is now manageable.  Denies any loss of control of bowel o
r bladder. Last MRI of lumbar spine completed in 2020.



 



Review of Systems



Current Outpatient Medications: 

  amitriptyline (ELAVIL) 50 mg tablet, Take  by mouth at bedtime daily., Disp
: , Rfl: 

  celecoxib (CELEBREX) 100 mg capsule, TAKE ONE (1) CAPSULE BY MOUTH TWICE DA
GUILLERMO , Disp: 180 capsule, Rfl: 0

  duloxetine DR (CYMBALTA) 30 mg capsule, Take 30 mg by mouth twice daily., D
isp: , Rfl: 

  furosemide (LASIX) 40 mg tablet, Take 40 mg by mouth daily., Disp: , Rfl: 

  HYDROcodone/acetaminophen (NORCO) 10/325 mg tablet, Take one tablet by mout
h every 12 hours as needed for Pain, Disp: 30 tablet, Rfl: 0

  HYDROcodone/acetaminophen (NORCO) 10/325 mg tablet, Take one tablet by mout
h every 6 hours as needed for Pain, Disp: 45 tablet, Rfl: 0

  lisinopriL (ZESTRIL) 10 mg tablet, Take  by mouth daily., Disp: , Rfl: 

  pantoprazole DR (PROTONIX) 40 mg tablet, Take 40 mg by mouth daily., Disp: 
, Rfl: 

  pregabalin (LYRICA) 75 mg capsule, Take 150 mg by mouth twice daily., Disp:
, Rfl: 

  tiZANidine (ZANAFLEX) 4 mg tablet, Take one-half tablet to one tablet by mo
uth twice daily as needed., Disp: 90 tablet, Rfl: 3

  Verification of Patch Placement and Integrity - Lidocaine 5%, , Disp: , Rfl
: 

  vilazodone (VIIBRYD) 20 mg tablet, Take 40 mg by mouth daily., Disp: , Rfl:


  VRAYLAR 3 mg cap, 1.5 mg. 1.5 MG, Disp: , Rfl: 

Allergies 

Allergen Reactions 

 Carvedilol SEE COMMENTS 



Physical Exam

Vitals: 

 10/18/21 1443 

Weight: (!) 194.6 kg (429 lb) 

Height: 198.1 cm (78") 

PainSc: Seven 



 

Pain Score: Seven

Body mass index is 49.58 kg/m.

General: 51 y.o. male appears stated age, in no acute distress

HEENT: Normocephalic, atraumatic

Neck: No thyroidmegaly

Cardiovascular: Well perfused

Pulmonary: Unlabored respirations

Extremities: No cyanosis, clubbing, or edema

Skin: No lesions seen on exposed skin

Psychiatric:  Appropriate mood and affect

Musculoskeletal: No atrophy. 

Neurologic: Antigravity strength in all extremities. CN II -XII grossly intact. 
Alert and oriented x 3. 

 



IMPRESSION:

1. S/P epidural steroid injection  

2. Lumbar radiculopathy  

3. Medication management  



PLAN:  

1.  Lifestyle modifications.  Recommend activity as tolerated.  Avoid provocativ
e maneuvers.  Keep spine in neutral position.

2.  Medications.  Patient provided prescription refill for 1 month of hydrocodon
e 30 tabs.  Patient will send request monthly from pharmacy when refills are nee
ded.  Not to exceed 90 tablets in 3 months.  Discussed with patient avoid taking
this medication scheduled or chronically to avoid opioid-induced hyper analgesi
a.  Medication usage and safety reviewed.

3.  Therapy.  Continue with home exercises.

4.  Imaging.  None indicated at this time.

5.  Interventions.  Recommend repeating right L3 transforaminal epidural steroid
injection in January.  If patient continues to have symptoms in the left, he wi
ll contact us prior to injection and may consider Leisy versus bilateral transfo
raminal.  Risks of the procedure were discussed including pain, bleeding, infect
ion, and damage to nearby structures.  Patient has elected to proceed with injec
tion at this time.

6.  Follow-up.  Patient to follow-up in 3 months for medication check as well as
repeat injection in January.



Todays visit took place via face-to-face encounter utilizing Zoom application. T
otal time 30 minutes.  Estimated counseling time 20 minutes.  Counseled Mr. Collin cornejo regarding medications and repeat injections. 



 



Electronically signed by Hue Tian APRN-NP at 10/18/2021  3:19 PM CDT

documented in this encounter



Plan of Treatment





                                        Order Schedule



                 Name            Type            Priority        Associated Diag

noses 

 

                                        2 Occurrences starting 10/18/2021 until 

10/18/2022



                 [facility] INJECT  Procedures      Routine         Lumbar rad

iculopathy 



                                         SNRB/TFESI LUMBAR/SACRAL    



documented as of this encounter



Visit Diagnoses









                                         Diagnosis

 





                                         S/P epidural steroid injection - Primar

y

 





                                         Lumbar radiculopathy



                                         Thoracic or lumbosacral neuritis or rad

iculitis, unspecified

 





                                         Medication management



                                         Encounter for long-term (current) use o

f other medications



documented in this encounter



Discontinued Medications





                          Start Date                End Date



                     Medication          Sig                 Discontinue  



                                         Reason  

 

                          2021                10/18/2021



                           HYDROcodone/acetaminophen  Take one   



                           (NORCO) 10/325 mg tablet  tablet by   



                                         mouth every   



                                         6 hours as   



                                         needed for   



                                         Pain   

 

                          10/26/2021                10/18/2021



                           HYDROcodone/acetaminophen  Take one   



                           (NORCO) 10/325 mg tablet  tablet by   



                                         mouth every   



                                         6 hours as   



                                         needed for   



                                         Pain   



documented as of this encounter



Additional Health Concerns





 



                           Assessment                Noted Time

 

 



                           A fall risk assessment has been completed for the pat

ient  2021 10:16 AM 

CDT



documented as of this encounter

## 2021-10-27 NOTE — XMS REPORT
Encounter Summary

                             Created on: 10/27/2021



DaljitOnel Michael

External Reference #: [id number]

: 1970

Sex: Male



Demographics





                          Address                   [address]

[place]  [address]

 

                          Home Phone                [phone]

 

                          Preferred Language        English

 

                          Marital Status            

 

                          Mosque Affiliation     Unknown

 

                          Race                      White

 

                          Ethnic Group              Not  or 





Author





                          Author                    [facility]

 

                          Organization              [facility]

 

                          Address                   Unknown

 

                          Phone                     Unavailable







Support





                Name            Relationship    Address         Phone

 

                    Natalie Bocanegra  ECON                [address] [place] [place]  [address]                       [phone]







Care Team Providers





                    Care Team Member Name Role                Phone

 

                    Hal Mary SANDERS Unavailable         [phone]

 

                    Ana Paula Lockwood RN Unavailable         Unavailable

 

                    Nura Mosqueda MD Unavailable         [phone]

 

                    Herbert Medeiros MD    Unavailable         Unavailable

 

                    Olivier Gomez-C PCP                 [phone]







Reason for Visit

* 



 



                           Reason                    Comments

 

 



                           Procedure                 EMG





* Consult, Test & Treat (Routine)



                          Referred By Contact       Referred To Contact



                 Status          Reason          Specialty       Diagnoses /  



                                         Procedures  

 

                                        



Kong Kay MD



[address] [facility] [place] [address]



Phone: [phone]



Fax: [phone]                       



Lian Diaz MD



[address] [place] [address]



Phone: [phone]



Fax: [phone]



                 Authorized      Specialty Services  Physical        Diagnoses  



                     Required            Medicine and        DDD (degenerative  



                           Rehabilitation /          disc disease),  



                           Rehabilitation            lumbar  



                           Medicine                  Lumbar radicular  



                                         pain  



                                         Chronic midline  



                                         low back pain with  



                                         sciatica, sciatica  



                                         laterality  



                                         unspecified  



                                         Tobacco use  



                                         disorder  



                                         BMI 45.0-49.9,  



                                         adult (HCC)  



                                         Foraminal stenosis  



                                         of lumbar region

  



                                         P  



                                         rocedures  



                                         EMG PROCEDURE  



                                         AZ NDL EMG 2 XTR  



                                         W/WO RELATED  



                                         PARASPINAL AREAS  











Encounter Details





                          Care Team                 Description



                     Date                Type                Department  

 

                                        



Lian Diaz MD



[address] [facility] [place] [address]



[phone] (Fax)                      Lumbar polyradiculopathy (Primary Dx); 

DDD (degenerative disc disease), lumbar



                     2021          Office Visit        [facility]

  



                                         [facility]  



                                         [address] [facility]  



                                         [place]  



                                         [address] [phone]  







Social History





                                        Date



                 Tobacco Use     Types           Packs/Day       Years Used 

 

                                         



                     Never Smoker        0                   0 

 

    



                           Smokeless Tobacco:        Chew  



                                         Current User   







                                        Comments: current use of smokless tobacc

o (chew)







                                        Comments



                           Alcohol Use               Standard Drinks/Week 

 

                                         



                           Yes                       8 (1 standard drink = 0.6 o

z pure alcohol) 







  



                     Alcohol Habits      Answer              Date Recorded

 

  



                           How often do you have a drink containing alcohol?  No

t asked 

 

  



                           How many drinks containing alcohol do you have on  No

t asked 



                                         a typical day when you are drinking?  

 

  



                     How often do you have six or more drinks on one  Weekly    

          2020



                                         occasion?  

 

  



                           Comment:                  Not asked 







 



                           Sex Assigned at Birth     Date Recorded

 

 



                           Male                      2020  3:37 PM CDT







                                        Date Recorded



                           COVID-19 Exposure         Response 

 

                                        2021 12:57 PM CDT



                           In the last month, have you been in contact with  No 

/ Unsure 



                                         someone who was confirmed or suspected 

to have  



                                         Coronavirus / COVID-19?  



documented as of this encounter



Last Filed Vital Signs





                    Reading             Time Taken          Comments



                                         Vital Sign   

 

                    124/72              2021  1:25 PM CDT  



                                         Blood Pressure   

 

                    67                  2021  1:25 PM CDT  



                                         Pulse   

 

                    36.6 C (97.8 F) 2021  1:25 PM CDT  



                                         Temperature   

 

                    16                  2021  1:25 PM CDT  



                                         Respiratory Rate   

 

                    98%                 2021  1:25 PM CDT  



                                         Oxygen Saturation   

 

                    -                   -                    



                                         Inhaled Oxygen   



                                         Concentration   

 

                    -                   -                    



                                         Weight   

 

                    198.1 cm (6' 6")    2021  1:25 PM CDT  



                                         Height   

 

                    -                   -                    



                                         Body Mass Index   



documented in this encounter



Functional Status





                                        Date of Assessment



                           Functional Status         Response 

 

                                        2021



                           Does the patient have a hearing impairment:  No 

 

                                        2021



                           Does the patient have a visual impairment:  Yes 

 

                                        2021



                           Does the patient have impaired ambulation:  Yes 

 

                                        2021



                           Does the patient have an activity of daily living  No

 



                                         (ADL) impairment:  

 

                                        2021



                           Does the patient have an instrumental activity of  Ye

s 



                                         daily living (IADL) impairment:  







                                        Date of Assessment



                           Cognitive Status          Response 

 

                                        2021



                           Does the patient have a cognitive impairment:  No 



documented as of this encounter



Procedure Notes

* Lian Diaz MD - 2021  1:30 PM CDT



Associated Order(s): EMG PROCEDURE



Procedure(s): AZ NDL EMG 1 XTR W/WO RELATED PARASPINAL AREAS; AZ NERVE CONDUCTIO
N STUDIES 3-4 STUDIES



Pre-Procedure Diagnose(s): DDD (degenerative disc disease), lumbar; Lumbar radic
ular pain; Chronic midline low back pain with sciatica, sciatica laterality unsp
ecified; Tobacco use disorder; BMI 45.0-49.9, adult (HCC); Foraminal stenosis of
lumbar region



Post-Procedure Diagnose(s): Lumbar polyradiculopathy



Formatting of this note might be different from the original.

Electrodiagnostic Laboratory Report



Impression:

ABNORMAL



1. There is electrodiagnostic evidence of acute on chronic right L4-S1 polyradic
ulopathy.

2. There is no evidence of a right tibial or fibular mononeuropathy.

3. There is no evidence of peripheral polyneuropathy. 



Clinical Correlation:

Follow-up with referring physician.  Given the progression of the radiculopathy 
since last EMG/NCS dated 2020, repeat imaging may be warranted.



Thank you for allowing me to perform electrodiagnostic testing on your patients.
 A full EMG/NCS report will be scanned into [facility]/EPIC, including waveforms.  If y
ou have any further questions or comments, please do not hesitate to call.



Lian Diaz MD

Diplomate, American Board of Physical Medicine and Rehabilitation

Diplomate, American Association of Neuromuscular and Electrodiagnostic Medicine





Electronically signed by Lian Diaz MD at 2021  2:08 PM CDT

documented in this encounter



Plan of Treatment





Not on filedocumented as of this encounter



Procedures





                                        Comments



                 Procedure Name  Priority        Date/Time       Associated Diag

nosis 

 

                                        



Results for this procedure are in the results section.



                 AZ NERVE CONDUCTION  Routine         2021      DDD (degen

erative disc 



                     STUDIES 3-4 STUDIES   1:30 PM CDT         disease), lumbar 



                                         Lumbar radicular pain 



                                         Chronic midline low back 



                                         pain with sciatica, 



                                         sciatica laterality 



                                         unspecified 



                                         Tobacco use disorder 



                                         BMI 45.0-49.9, adult 



                                         (HCC) 



                                         Foraminal stenosis of 



                                         lumbar region 

 

                                        



Results for this procedure are in the results section.



                 AZ NDL EMG 1 XTR W/WO  Routine         2021      DDD (deg

enerative disc 



                     RELATED PARASPINAL AREAS   1:30 PM CDT         disease), mihai

mbar 



                                         Lumbar radicular pain 



                                         Chronic midline low back 



                                         pain with sciatica, 



                                         sciatica laterality 



                                         unspecified 



                                         Tobacco use disorder 



                                         BMI 45.0-49.9, adult 



                                         (HCC) 



                                         Foraminal stenosis of 



                                         lumbar region 



documented in this encounter



Results

* EMG PROCEDURE (2021  1:30 PM CDT)



 



                           Narrative                 Performed At

 

 



                           This result has an attachment that is not available. 

 OTHER OUTSIDE LAB



                                         Lian Diaz MD   2021 

2:08 PM 



                                         Electrodiagnostic Laboratory Report 



                                         Impression: 



                                         ABNORMAL 



                                         1. There is electrodiagnostic evidence 

of acute on chronic right 



                                         L4-S1 polyradiculopathy. 



                                         2. There is no evidence of a right tibi

al or fibular 



                                         mononeuropathy. 



                                         3. There is no evidence of peripheral p

olyneuropathy. 



                                         Clinical Correlation: 



                                         Follow-up with referring physician. G

iven the progression of the 



                                         radiculopathy since last EMG/NCS dated 

2020, repeat imaging 



                                         may be warranted. 



                                         Thank you for allowing me to perform el

ectrodiagnostic testing on 



                                         your patients. A full EMG/NCS report 

will be scanned into 



                                         [facility]/EPIC, including waveforms. If you 

have any further questions 



                                         or comments, please do not hesitate to 

call. 



                                         Lian Diaz MD 



                                         Diplomate, American Board of Physical M

edicine and Rehabilitation 



                                         Diplomate, American Association of Neur

omuscular and 



                                         Electrodiagnostic Medicine 







                                        Procedure Note

 

                                        



Lian Diaz MD - 2021  1:30 PM CDT



Formatting of this note might be different from the original.

Electrodiagnostic Laboratory Report



Impression:

ABNORMAL



                                        1. There is electrodiagnostic evidence o

f acute on chronic right L4-S1 

polyradiculopathy.

                                        2. There is no evidence of a right tibia

l or fibular mononeuropathy.

                                        3. There is no evidence of peripheral po

lyneuropathy. 



Clinical Correlation:

Follow-up with referring physician.  Given the progression of the radiculopathy 
since last EMG/NCS dated 2020, repeat imaging may be warranted.



Thank you for allowing me to perform electrodiagnostic testing on your patients.
 A full EMG/NCS report will be scanned into [facility]/EPIC, including waveforms.  If 
you have any further questions or comments, please do not hesitate to call.



Lian Diaz MD

Diplomate, American Board of Physical Medicine and Rehabilitation

Diplomate, American Association of Neuromuscular and Electrodiagnostic Medicine









   



                 Performing Organization  Address         City/State/ZIP Code  P

melvin Number

 

   



                                         OTHER OUTSIDE LAB   





documented in this encounter



Visit Diagnoses









                                         Diagnosis

 





                                         Lumbar polyradiculopathy - Primary



                                         Neuralgia, neuritis, and radiculitis, u

nspecified

 





                                         DDD (degenerative disc disease), lumbar



                                         Degeneration of lumbar or lumbosacral i

ntervertebral disc



documented in this encounter



Additional Health Concerns





 



                           Assessment                Noted Time

 

 



                           A fall risk assessment has been completed for the pat

ient  2021 10:16 AM 

CDT



documented as of this encounter

## 2021-10-27 NOTE — ED LOWER EXTREMITY
General


Chief Complaint:  Lower Extremity


Stated Complaint:  R KNEE PAIN


Nursing Triage Note:  


AMB TO ED WITH CRUTCHES REPORTS WAS  GETTING OUT OF HIS CAR THIS PM. AND TWISTED




HIS R KNEE. UNABLE TO PUT ANY WT ON IT.


Source:  patient


Exam Limitations:  no limitations





History of Present Illness


Date Seen by Provider:  Oct 27, 2021


Time Seen by Provider:  18:46


Initial Comments


Patient is a 51-year-old male who presents to the emergency department today 

with a chief complaint of acute knee pain.  Patient states that he was getting 

out of his car and stepped in some mud and slipped/twisted, immediately had a 

popping sensation and pain in the lateral aspect of the right knee.  Patient 

states since that time he has been unable to put any weight on i t.  He denies 

any previous injuries to the right knee but has had previous scopes to the left 

knee by Dr. Garnica.  Patient states he has a little bit of tingling in the right

lower leg but also has a history of chronic back issues and is followed at  by

Ortho/neuro back surgeon.  He is on hydrocodone once daily for chronic back p

ain.





Patient denies any right hip pain, denies any right foot or ankle pain.  He did 

not actually fall to the ground.  He complains of a mild amount of swelling to 

the right knee.


No recent illnesses such as fevers, chills cough or congestion.  Patient is 

Covid vaccinated.





All other review of systems reviewed and negative except as stated.


Onset:  just prior to arrival


Pain/Injury Location:  right knee


Method of Injury:  twisted


Modifying Factors:  Improves With Immobilization; Worse With Movement





Allergies and Home Medications


Allergies


Coded Allergies:  


     carvedilol (Verified  Allergy, Severe, LETHARGIC, 12/7/20)





Patient Home Medication List


Home Medication List Reviewed:  Yes


Amitriptyline HCl (Amitriptyline HCl) 50 Mg Tablet, 50 MG PO HS, (Reported)


   Entered as Reported by: RADHA BOLIVAR on 12/7/20 1304


Cariprazine Hydrochloride (Vraylar) 3 Mg Capsule, 3 MG PO DAILY, (Reported)


   Entered as Reported by: RADHA BOLIVAR on 12/7/20 1304


Celecoxib (Celebrex) 100 Mg Capsule, 100 MG PO BID PRN for PAIN-MODERATE (5-7), 

(Reported)


   Entered as Reported by: RADHA BOLIVAR on 12/7/20 1304


Duloxetine HCl (Cymbalta) 60 Mg Capsule., 60 MG PO DAILY, (Reported)


   Entered as Reported by: RADHA BOLIVAR on 12/7/20 1256


Furosemide (Furosemide) 40 Mg Tablet, 40 MG PO DAILY PRN for SWELLING, (Report

ed)


   Entered as Reported by: RADHA BOLIVAR on 12/7/20 1304


Gabapentin (Neurontin) 300 Mg Capsule, 300 MG PO TID, (Reported)


   Entered as Reported by: RADHA BOLIVAR on 12/7/20 1304


Lisinopril (Lisinopril) 10 Mg Tablet, 10 MG PO DAILY, (Reported)


   Entered as Reported by: RADHA BOLIVAR on 12/7/20 1304


Oxycodone HCl/Acetaminophen (Oxycodone-Acetaminophen 5-325) 1 Each Tablet, 1 

EACH PO Q4H PRN for PAIN-SEVERE


   Prescribed by: JODY MAI on 12/9/20 1141


Pantoprazole Sodium (Pantoprazole Sodium) 40 Mg Tablet., 40 MG PO DAILY, 

(Reported)


   Entered as Reported by: RADHA BOLIVAR on 12/7/20 1304


Sildenafil Citrate (Sildenafil Citrate) 100 Mg Tablet, 100 MG PO PRN, (Reported)


   Entered as Reported by: RADHA BOLIVAR on 12/7/20 1304


Tizanidine HCl (Zanaflex) 4 Mg Capsule, 2-4 MG PO Q6H PRN for MUSCLE SPASMS, 

(Reported)


   Entered as Reported by: RADHA BOLIVAR on 12/7/20 1256





Review of Systems


Constitutional:  see HPI


EENTM:  no symptoms reported


Respiratory:  no symptoms reported


Cardiovascular:  no symptoms reported


Gastrointestinal:  no symptoms reported


Musculoskeletal:  joint pain (right knee)


Skin:  no symptoms reported





All Other Systems Reviewed


Negative Unless Noted:  Yes





Past Medical-Social-Family Hx


Patient Social History


Substance use?:  No


Alcohol Use?:  Yes


Alcohol Frequency:  Rarely





Immunizations Up To Date


First/Initial COVID19 Vaccinat:  MARCH


COVID19 Vaccine :  J&J





Seasonal Allergies


Seasonal Allergies:  No





Past Medical History


Respiratory:  No


Cardiac:  Yes (LISINIPRIL FOR CARDIOMYOPATHY NOT HYPERTENSION)


Cardiomyopathy


Neurological:  Yes


Neuropathy


Sexually Transmitted Disease:  No


HIV/AIDS:  No


Genitourinary:  Yes


Kidney Stones


Gastrointestinal:  Yes (PROTONIX FOR GASTRIC BYPASS)


Musculoskeletal:  Yes


Arthritis, Chronic Back Pain


Endocrine:  No


HEENT:  Yes (GLASSES)


Cancer:  No


Psychosocial:  Yes


Depression


Integumentary:  No


Blood Disorders:  No


Adverse Reaction/Blood Tranf:  No (N/A)





Physical Exam


Vital Signs





Vital Signs - First Documented








 10/27/21





 18:41


 


Pulse 88


 


Resp 18


 


B/P (MAP) 145/85 (105)


 


Pulse Ox 97


 


O2 Delivery Room Air





Capillary Refill : Less Than 3 Seconds


Height, Weight, BMI


Height: '"


Weight: lbs. oz. kg; 50.00 BMI


Method:


General Appearance:  WD/WN, no apparent distress


Neck:  normal inspection


Cardiovascular:  regular rate, rhythm


Respiratory:  no respiratory distress, no accessory muscle use


Hips:  bilateral hip non-tender, bilateral hip normal inspection, bilateral hip 

normal range of motion, bilateral hip no evidence of injury


Legs:  bilateral leg non-tender, bilateral leg normal inspection, bilateral leg 

normal range of motion, bilateral leg no evidence of injury


Knees:  left knee non-tender, left knee normal inspection, left knee normal 

range of motion, left knee no evidence of injury; right knee swelling (Patient 

has mild amount of swelling over the anterior inferior and lateral portion of 

the right knee.  Tenderness over the lateral joint line.  I am unable to perform

Vitaliy's or Apley's test secondary to the patient's size and apprehension with

exam.  No significant effusion is noted.  He has significant tenderness with 

lateral knee stress.  He is really unable to flex the right knee secondary to 

pain but in limited flexion anterior drawer and posterior drawer tests are 

negative.)


Ankles:  bilateral ankle non-tender, bilateral ankle normal inspection, 

bilateral ankle normal range of motion


Neurologic/Tendon:  normal sensation, normal motor functions, normal tendon 

functions


Neurologic/Psychiatric:  alert, normal mood/affect, oriented x 3, other (Patient

does endorse a little "tingling" to the right lower leg)


Skin:  normal color, warm/dry





Progress/Results/Core Measures


Results/Orders


My Orders





Orders - BRADFORD WARNER MD


Hydrocodone/Apap 10/325 Tablet (Lortab 1 (10/27/21 19:00)


Knee, Right, 3 Views (10/27/21 18:51)





Vital Signs/I&O











 10/27/21





 18:41


 


Pulse 88


 


Resp 18


 


B/P (MAP) 145/85 (105)


 


Pulse Ox 97


 


O2 Delivery Room Air














Blood Pressure Mean:                    105











Progress


Progress Note :  


   Time:  19:03


Progress Note


We do not have a knee immobilizer of sufficient width to fit the patient.  I 

will recommend ice and elevation as much as possible. He is already on anti-

inflammatories (celebrex twice daily) and has a prescription for hydrocodone at 

home.  Would recommend to him crutch walking with weight bearing as tolerated 

(no significant joint laxity or fractures noted that would mean he has to be 

completely non weight bearing).  He has seen Dr Garnica in the past with opposite

knee scoped - Dr Herrera is on call.  WIll give the patient contact information 

for both offices.  Patient verbalizes understanding, he is comfortable with plan

of care, all questions are sought and answered.





Departure


Impression





   Primary Impression:  


   Internal derangement of right knee


Disposition:  01 HOME, SELF-CARE


Condition:  Stable





Departure-Patient Inst.


Decision time for Depature:  19:00


Referrals:  


[facility] ([place])


Primary Care Physician








ALLEN SCRUGGS (Family)


Primary Care Physician








ELIZABETH HERRERA MD, MICHAEL P MD


Patient Instructions:  Ligament Injuries in the Knee (DC)





Add. Discharge Instructions:  


Continue your Celebrex and hydrocodone at home for pain.  You can take the 

hydrocodone every 6 hours as needed for pain - keep in mind this medication can 

be addictive, and will also make you sleepy. Do not drive or work while taking 

this medication.





Please call Dr Garnica's office tomorrow for a follow up appointment. I have also

given you contact information for Dr Herrera (our other Ortho doc), should you not

be able to get in to Dr Garnica's office in a timely fashion.





You can toe-touch weight bear on the affected leg as tolerated.  Ice the knee to

prevent further swelling and try and keep it a little elevated while you are at 

rest.





Return to the ER for re-evaluation for any new, concerning or emergent 

complaints.





Copy


Copies To 1:   GRISEL GARNICA MD, KATHRYN M MD         Oct 27, 2021 19:01

## 2021-10-27 NOTE — XMS REPORT
Encounter Summary

                             Created on: 10/27/2021



Onel Bocanegra

External Reference #: [id number]

: 1970

Sex: Male



Demographics





                          Address                   [address] [place]  [address]

 

                          Home Phone                [phone]

 

                          Preferred Language        English

 

                          Marital Status            

 

                          Scientologist Affiliation     Unknown

 

                          Race                      White

 

                          Ethnic Group              Not  or 





Author





                          Author                    [facility]

 

                          Organization              [facility]

 

                          Address                   Unknown

 

                          Phone                     Unavailable







Support





                Name            Relationship    Address         Phone

 

                    Natalie Bocanegra  [employer]                [address] [place]  [address]                       [phone]







Care Team Providers





                    Care Team Member Name Role                Phone

 

                    Mary Hong Unavailable         [phone]

[phone]

 

                    Ana Paula Lockwood RN Unavailable         Unavailable

 

                    Nura Mosqueda MD Unavailable         [phone]

 

                    Herbert Medeiros MD    Unavailable         Unavailable

 

                    Olivier Gomez PA-C PCP                 [phone]







Encounter Details





                          Care Team                 Description



                     Date                Type                Department  

 

                                                     



                           2021                Travel   







Social History





                                        Date



                 Tobacco Use     Types           Packs/Day       Years Used 

 

                                         



                     Never Smoker        0                   0 

 

    



                           Smokeless Tobacco:        Chew  



                                         Current User   







                                        Comments: current use of smokless tobacc

o (chew)







                                        Comments



                           Alcohol Use               Standard Drinks/Week 

 

                                         



                           Yes                       8 (1 standard drink = 0.6 o

z pure alcohol) 







  



                     Alcohol Habits      Answer              Date Recorded

 

  



                           How often do you have a drink containing alcohol?  No

t asked 

 

  



                           How many drinks containing alcohol do you have on  No

t asked 



                                         a typical day when you are drinking?  

 

  



                     How often do you have six or more drinks on one  Weekly    

          2020



                                         occasion?  

 

  



                           Comment:                  Not asked 







 



                           Sex Assigned at Birth     Date Recorded

 

 



                           Male                      2020  3:37 PM CDT







                                        Date Recorded



                           COVID-19 Exposure         Response 

 

                                        2021 12:57 PM CDT



                           In the last month, have you been in contact with  No 

/ Unsure 



                                         someone who was confirmed or suspected 

to have  



                                         Coronavirus / COVID-19?  



documented as of this encounter



Functional Status





                                        Date of Assessment



                           Functional Status         Response 

 

                                        2021



                           Does the patient have a hearing impairment:  No 

 

                                        2021



                           Does the patient have a visual impairment:  Yes 

 

                                        2021



                           Does the patient have impaired ambulation:  Yes 

 

                                        2021



                           Does the patient have an activity of daily living  No

 



                                         (ADL) impairment:  

 

                                        2021



                           Does the patient have an instrumental activity of  Ye

s 



                                         daily living (IADL) impairment:  







                                        Date of Assessment



                           Cognitive Status          Response 

 

                                        2021



                           Does the patient have a cognitive impairment:  No 



documented as of this encounter



Plan of Treatment





Not on filedocumented as of this encounter



Visit Diagnoses

Not on filedocumented in this encounter



Additional Health Concerns





 



                           Assessment                Noted Time

 

 



                           A fall risk assessment has been completed for the pat

ient  2021 10:16 AM 

CDT



documented as of this encounter

## 2021-10-27 NOTE — XMS REPORT
Encounter Summary

                             Created on: 10/27/2021



Onel Bocanegra

External Reference #: [id number]

: 1970

Sex: Male



Demographics





                          Address                   [address] [place]  [address]

 

                          Home Phone                [phone]

 

                          Preferred Language        English

 

                          Marital Status            

 

                          Caodaism Affiliation     Unknown

 

                          Race                      White

 

                          Ethnic Group              Not  or 





Author





                          Author                    [facility]

 

                          Organization              [facility]

 

                          Address                   Unknown

 

                          Phone                     Unavailable







Support





                Name            Relationship    Address         Phone

 

                    Natalie Bocanegra  [employer]                [address] [place]  [address]                       [phone]







Care Team Providers





                    Care Team Member Name Role                Phone

 

                    Mary Hong Unavailable         [phone]

[phone]

 

                    Ana Paula Lockwood RN Unavailable         Unavailable

 

                    Nura Mosqueda MD Unavailable         [phone]

 

                    Herbert Medeiros MD    Unavailable         Unavailable

 

                    Olivier Gomez PA-C PCP                 [phone]







Encounter Details





                          Care Team                 Description



                     Date                Type                Department  

 

                                                     



                           2021                Travel   







Social History





                                        Date



                 Tobacco Use     Types           Packs/Day       Years Used 

 

                                         



                     Never Smoker        0                   0 

 

    



                           Smokeless Tobacco:        Chew  



                                         Current User   







                                        Comments: current use of smokless tobacc

o (chew)







                                        Comments



                           Alcohol Use               Standard Drinks/Week 

 

                                         



                           Yes                       8 (1 standard drink = 0.6 o

z pure alcohol) 







  



                     Alcohol Habits      Answer              Date Recorded

 

  



                           How often do you have a drink containing alcohol?  No

t asked 

 

  



                           How many drinks containing alcohol do you have on  No

t asked 



                                         a typical day when you are drinking?  

 

  



                     How often do you have six or more drinks on one  Weekly    

          2020



                                         occasion?  

 

  



                           Comment:                  Not asked 







 



                           Sex Assigned at Birth     Date Recorded

 

 



                           Male                      2020  3:37 PM CDT







                                        Date Recorded



                           COVID-19 Exposure         Response 

 

                                        2021  9:29 AM CDT



                           In the last month, have you been in contact with  No 

/ Unsure 



                                         someone who was confirmed or suspected 

to have  



                                         Coronavirus / COVID-19?  



documented as of this encounter



Functional Status





                                        Date of Assessment



                           Functional Status         Response 

 

                                        2021



                           Does the patient have a hearing impairment:  No 

 

                                        2021



                           Does the patient have a visual impairment:  Yes 

 

                                        2021



                           Does the patient have impaired ambulation:  Yes 

 

                                        2021



                           Does the patient have an activity of daily living  No

 



                                         (ADL) impairment:  

 

                                        2021



                           Does the patient have an instrumental activity of  Ye

s 



                                         daily living (IADL) impairment:  







                                        Date of Assessment



                           Cognitive Status          Response 

 

                                        2021



                           Does the patient have a cognitive impairment:  No 



documented as of this encounter



Plan of Treatment





Not on filedocumented as of this encounter



Visit Diagnoses

Not on filedocumented in this encounter



Additional Health Concerns





 



                           Assessment                Noted Time

 

 



                           A fall risk assessment has been completed for the pat

ient  2021 10:16 AM 

CDT



documented as of this encounter

## 2021-10-27 NOTE — XMS REPORT
Encounter Summary

                             Created on: 10/27/2021



Silas Bocanegrajohn Michael

External Reference #: [id number]

: 1970

Sex: Male



Demographics





                          Address                   [address] [place]  [address]

 

                          Home Phone                [phone]

 

                          Preferred Language        English

 

                          Marital Status            

 

                          M[employer]on Affiliation     Unknown

 

                          Race                      White

 

                          Ethnic Group              Not  or 





Author





                          Author                    [facility]

 

                          Organization              [facility]

 

                          Address                   Unknown

 

                          Phone                     Unavailable







Support





                Name            Relationship    Address         Phone

 

                    Natalie Bocanegra  EC[employer]                [address] [place]  [address]                       [phone]







Care Team Providers





                    Care Team Member Name Role                Phone

 

                    Mary Hong-NP Unavailable         [phone]

 

                    Ana Paula Lockwood RN Unavailable         Unavailable

 

                    Nura Mosqueda MD Unavailable         [phone]

 

                    Herbert Medeiros MD    Unavailable         Unavailable

 

                    Olivier Gomez PA-C PCP                 [phone]







Reason for Visit

* 



 



                           Reason                    Comments

 

 



                                         Medication Refill 









Encounter Details





                          Care Team                 Description



                     Date                Type                Department  

 

                                        



Hue Tian, APRN-NP



[address]



[address]



[place] [address] [phone] (Fax)                       



                     10/18/2021          Refill              [facility]

 Re[facility]  



                                         Med: [facility] [address]  



                                         [facility]: [id number]  



                                         [address]  



                                         [address]  



                                         [place]  



                                         [address] [phone]  







Social History





                                        Date



                 Tobacco Use     Types           Packs/Day       Years Used 

 

                                         



                     Never Smoker        0                   0 

 

    



                           Smokeless Tobacco:        Chew  



                                         Current User   







                                        Comments: current use of smokless tobacc

o (chew)







                                        Comments



                           Alcohol Use               Standard Drinks/Week 

 

                                         



                           Yes                       8 (1 standard drink = 0.6 o

z pure alcohol) 







  



                     Alcohol Habits      Answer              Date Recorded

 

  



                           How often do you have a drink containing alcohol?  No

t asked 

 

  



                           How many drinks containing alcohol do you have on  No

t asked 



                                         a typical day when you are drinking?  

 

  



                     How often do you have six or more drinks on one  Weekly    

          2020



                                         occasion?  

 

  



                           Comment:                  Not asked 







 



                           Sex Assigned at Birth     Date Recorded

 

 



                           Male                      2018  3:37 PM CDT







                                        Date Recorded



                           COVID-19 Exposure         Response 

 

                                        10/18/2021  2:45 PM CDT



                           In the last month, have you been in contact with  No 

/ Unsure 



                                         someone who was confirmed or suspected 

to have  



                                         Coronavirus / COVID-19?  



documented as of this encounter



Functional Status





                                        Date of Assessment



                           Functional Status         Response 

 

                                        2021



                           Does the patient have a hearing impairment:  No 

 

                                        2021



                           Does the patient have a visual impairment:  Yes 

 

                                        2021



                           Does the patient have impaired ambulation:  Yes 

 

                                        2021



                           Does the patient have an activity of daily living  No

 



                                         (ADL) impairment:  

 

                                        2021



                           Does the patient have an instrumental activity of  Ye

s 



                                         daily living (IADL) impairment:  







                                        Date of Assessment



                           Cognitive Status          Response 

 

                                        2021



                           Does the patient have a cognitive impairment:  No 



documented as of this encounter



Miscellaneous Notes

* Telephone Encounter - Promise Fatima RN - 10/18/2021  2:49 PM CDT



Formatting of this note might be different from the original.

Last O/V: 10/18/2021

F/U Visit: None



Routing to Provider for Review



Electronically signed by Promise Fatima RN at 10/18/2021  2:50 PM CDT

documented in this encounter



Plan of Treatment





Not on filedocumented as of this encounter



Visit Diagnoses

Not on filedocumented in this encounter



Additional Health Concerns





 



                           Assessment                Noted Time

 

 



                           A fall risk assessment has been completed for the pat

ient  2021 10:16 AM 

CDT



documented as of this encounter

## 2021-10-27 NOTE — XMS REPORT
Encounter Summary

                             Created on: 10/27/2021



Onel Bocanegra

External Reference #: [id number]

: 1970

Sex: Male



Demographics





                          Address                   [address] [place]  [address]

 

                          Home Phone                [phone]

 

                          Preferred Language        English

 

                          Marital Status            

 

                          Jew Affiliation     Unknown

 

                          Race                      White

 

                          Ethnic Group              Not  or 





Author





                          Author                    [facility]

 

                          Organization              [facility]

 

                          Address                   Unknown

 

                          Phone                     Unavailable







Support





                Name            Relationship    Address         Phone

 

                    Natalie Bocanegra  EC[employer]                [address] [place]  [address]                       [phone]







Care Team Providers





                    Care Team Member Name Role                Phone

 

                    Mary Hong Unavailable         [phone]

 

                    Ana Paula Lockwood RN Unavailable         Unavailable

 

                    Nura Mosqueda MD Unavailable         [phone]

 

                    Herbert Medeiros MD    Unavailable         Unavailable

 

                    Olivier Gomez PA-C PCP                 [phone]







Reason for Referral

* Pain Authorization (Routine)



                          Referred By Contact       Referred To Contact



                 Status          Reason          Specialty       Diagnoses /  



                                         Procedures  

 

                                        



Hue Tian APRN-NP



[address]



[address]



[place] [address]



Phone: [phone]



Fax: [phone]                       



[facility]



[address]



[place] [address]



Phone: [phone]



                     Authorized          Anesthesia Pain     Diagnoses  



                                         Lumbar  



                                         radiculopathy

  



                                         P  



                                         rocedures  



                                         [facility]  



                                         INJECT SNRB/TFESI  



                                         LUMBAR/SACRAL  









Electronically signed by Hue SANDERS at 





Reason for Visit

* 



 



                           Reason                    Comments

 

 



                                         Procedure 





* Pain Authorization (Routine)



                          Referred By Contact       Referred To Contact



                 Status          Reason          Specialty       Diagnoses /  



                                         Procedures  

 

                                        



Hue Tian APRN-NP



[address]



[address]



[place] [address]



Phone: [phone]



Fax: [phone]                       



[facility]



[address]



[place] [address]



Phone: [phone]



                     Authorized          Anesthesia Pain     Diagnoses  



                                         Lumbar  



                                         radiculopathy

  



                                         P  



                                         rocedures  



                                         [facility]  



                                         INJECT SNRB/TFESI  



                                         LUMBAR/SACRAL  











Encounter Details





                          Care Team                 Description



                     Date                Type                Department  

 

                                        



Bel Ludwig MD



[address]







[place], [place] [address] [phone] (Fax)                       



                     2021          Hospital            Pre/Post-Operative 

Care:  



                           Encounter                 [facility]  



                                         [facility]  



                                         [address]  



                                         [place], [place]  



                                         [id number] [phone]  







Social History





                                        Date



                 Tobacco Use     Types           Packs/Day       Years Used 

 

                                         



                     Never Smoker        0                   0 

 

    



                           Smokeless Tobacco:        Chew  



                                         Current User   







                                        Comments: current use of smokless tobacc

o (chew)







                                        Comments



                           Alcohol Use               Standard Drinks/Week 

 

                                         



                           Yes                       8 (1 standard drink = 0.6 o

z pure alcohol) 







  



                     Alcohol Habits      Answer              Date Recorded

 

  



                           How often do you have a drink containing alcohol?  No

t asked 

 

  



                           How many drinks containing alcohol do you have on  No

t asked 



                                         a typical day when you are drinking?  

 

  



                     How often do you have six or more drinks on one  Weekly    

          2020



                                         occasion?  

 

  



                           Comment:                  Not asked 







 



                           Sex Assigned at Birth     Date Recorded

 

 



                           Male                      2020  3:37 PM CDT







                                        Date Recorded



                           COVID-19 Exposure         Response 

 

                                        2021 12:02 PM CDT



                           In the last month, have you been in contact with  No 

/ Unsure 



                                         someone who was confirmed or suspected 

to have  



                                         Coronavirus / COVID-19?  



documented as of this encounter



Last Filed Vital Signs





                    Reading             Time Taken          Comments



                                         Vital Sign   

 

                    121/73              2021  1:45 PM CDT  



                                         Blood Pressure   

 

                    97                  2021  1:31 PM CDT  



                                         Pulse   

 

                    36.6 C (97.9 F) 2021 12:15 PM CDT  



                                         Temperature   

 

                    -                   -                    



                                         Respiratory Rate   

 

                    100%                2021  1:45 PM CDT  



                                         Oxygen Saturation   

 

                    -                   -                    



                                         Inhaled Oxygen   



                                         Concentration   

 

                    193.2 kg (426 lb)   2021 12:15 PM CDT  



                                         Weight   

 

                    198.1 cm (6' 6")    2021 12:15 PM CDT  



                                         Height   

 

                    49.23               2021 12:15 PM CDT  



                                         Body Mass Index   



documented in this encounter



Functional Status





                                        Date of Assessment



                           Functional Status         Response 

 

                                        2021



                           Does the patient have a hearing impairment:  No 

 

                                        2021



                           Does the patient have a visual impairment:  Yes 

 

                                        2021



                           Does the patient have impaired ambulation:  Yes 

 

                                        2021



                           Does the patient have an activity of daily living  No

 



                                         (ADL) impairment:  

 

                                        2021



                           Does the patient have an instrumental activity of  Ye

s 



                                         daily living (IADL) impairment:  







                                        Date of Assessment



                           Cognitive Status          Response 

 

                                        2021



                           Does the patient have a cognitive impairment:  No 



documented as of this encounter



Discharge Instructions

* Instructions* 



 Светлана Vieyra RN - 2021 12:30 PM CDT



Formatting of this note is different from the original.

GENERAL POST PROCEDURE INSTRUCTIONS

Physician: _________________________________

Procedure Completed Today:

o Joint Injection (hip, knee, shoulder)

o Cervical Epidural Steroid Injection

o Cervical Transforaminal Steroid Injection

o Trigger Point Injection

o Caudal Epidural Steroid Injection

o Pudendal Nerve Block

o Other _____________________ o Thoracic Epidural Steroid Injection

o Lumbar Epidural Steroid Injection

o Lumbar Transforaminal Steroid Injection

o Facet Joint Injection

o Celiac Nerve Block

o Sacrococcygeal

o Sacroiliac Joint Injection 

Important information following your procedure today:

o You may drive today   

o If you had sedation, you may NOT drive today

- Rest at home for the next 6 hours.  You may then begin to resume your normal a
ctivities.

- DO NOT drive any vehicle, operate any power tools, drink alcohol, make any adis
or decisions, or sign any legal documents for the next 12 hours.

1. Pain relief may not be immediate. It is possible you may even experience an i
ncrease in pain during the first 24-48 hours followed by a gradual decrease of y
our pain.

2. Though the procedure is generally safe, and complications are rare, we do ask
that you be aware of any of the following:

 Any swelling, persistent redness, new bleeding or drainage from the site of 
the injection.

 You should not experience a severe headache.

 You should not run a fever over 101oF.

 New onset of sharp, severe back and or neck pain.

 New onset of upper or lower extremity numbness or weakness.

 New difficulty controlling bowel or bladder function after injection.

 New shortness of breath.

** If any of these occur, please call to report this occurrence to a nurse at [phone]
[phone]. If you are calling after 4:00 p.m. or on weekends or holidays, ada callahan call [phone] and ask to have the resident physician on call for the marian garcia paged or go to your local emergency room.

3. You may experience soreness at the injection site. Ice can be applied at 20-m
inute intervals for the first 24 hours. The following day you may alternate ice 
with heat if you are experiencing muscle tightness, otherwise continue with ice.
Ice works best at decreasing pain. Avoid application of direct heat, hot showers
or hot tubs today.

4. Avoid strenuous activity today. You many resume your regular activities and e
xercise tomorrow.

5. Patients with diabetes may see an elevation in blood sugars for 7-10 days aft
er the injection. It is important to pay close attention to your diet, check you
r blood sugars daily and report extreme elevations to the physician that manages
your diabetes.

6. Patients taking daily blood thinners can resume their regular dose this clari cruz.

7. It is important that you take all medications ordered by your pain physician.
Taking medications as ordered is an important part of your pain care plan. If y
ou cannot continue the medication plan, please notify the physician.



Possible side effects to steroids that may occur:

 Flushing or redness of the face

 Irritability

 Fluid retention

 Change in women's menses

 Minor headache



If you are unable to keep your upcoming appointment, please notify the [facility]  at [phone] at least 24 hours in advance. If you have question
s for the surgery center, call [facility] at [phone]
[phone]. 





Electronically signed by Светлана Vieyra RN at 2021  6:21 AM CDT





documented in this encounter



Medications at Time of Discharge





                          Start Date                End Date



                 Medication      Sig             Dispensed       Refills  

 

                          2017                 



                     amitriptyline (ELAVIL) 50  Take  by            0  



                           mg tablet                 mouth at    



                                         bedtime    



                                         daily.    

 

                          2021                 



                 celecoxib (CELEBREX) 100  TAKE ONE (1)    180 capsule     0  



                           mg capsule                CAPSULE BY    



                                         MOUTH TWICE    



                                         DAILY    

 

                                                     



                     duloxetine DR (CYMBALTA)  Take 30 mg by       0  



                           30 mg capsule             mouth twice    



                                         daily.    

 

                          2020                 



                     furosemide (LASIX) 40 mg  Take 40 mg by       0  



                           tablet                    mouth daily.    

 

                          2021                 



                 HYDROcodone/acetaminophen  Take one        45 tablet       0  



                           (NORCO) 10/325 mg tablet  tablet by    



                                         mouth every 6    



                                         hours as    



                                         needed for    



                                         Pain    

 

                          2017                 



                     lisinopriL (ZESTRIL) 10  Take  by            0  



                           mg tablet                 mouth daily.    

 

                          2020                 



                     pantoprazole DR     Take 40 mg by       0  



                           (PROTONIX) 40 mg tablet   mouth daily.    

 

                          2021                 



                     pregabalin (LYRICA) 75 mg  Take 150 mg         0  



                           capsule                   by mouth    



                                         twice daily.    

 

                          2020                 



                 tiZANidine (ZANAFLEX) 4  Take one-half   90 tablet       3  



                           mg tablet                 tablet to one    



                                         tablet by    



                                         mouth twice    



                                         daily as    



                                         needed.    

 

                          10/01/2019                 



                           Verification of Patch     0  



                                         Placement and Integrity -     



                                         Lidocaine 5%     

 

                                                     



                     vilazodone (VIIBRYD) 20  Take 40 mg by       0  



                           mg tablet                 mouth daily.    

 

                          2020                 



                     VRAYLAR 3 mg cap    1.5 mg. 1.5         0  



                                         MG    

 

                          2021                10/18/2021



                 HYDROcodone/acetaminophen  Take one        30 tablet       0  



                           (NORCO) 10/325 mg tablet  tablet by    



                                         mouth every 6    



                                         hours as    



                                         needed for    



                                         Pain    

 

                          10/26/2021                10/18/2021



                 HYDROcodone/acetaminophen  Take one        30 tablet       0  



                           (NORCO) 10/325 mg tablet  tablet by    



                                         mouth every 6    



                                         hours as    



                                         needed for    



                                         Pain    



documented as of this encounter



Discharge Disposition





                    Code                Departure Means     Destination



                                         Disposition   

 

                                                            Home



                                         Home or Self Care   



documented in this encounter



Progress Notes

* Bel Ludwig MD - 2021 12:30 PM CDT



Formatting of this note is different from the original.

 [facility]

INTERVENTIONAL PAIN PROCEDURE HISTORY AND PHYSICAL



No chief complaint on file.



HISTORY OF PRESENT ILLNESS:  Onel Bocanegra is a 51 y.o. year old male
who presents for injection.  Denies fevers, chills, or recent hospitalizations. 
Patient denies currently taking blood thinning medications.  



Medical History: 

Diagnosis Date 

 Anxiety  

 Cardiomyopathy (HCC)  

 Deafness  

 left ear 

 Degenerative disc disease, cervical  

 Degenerative disc disease, lumbar  

 Degenerative disc disease, thoracic  

 Depression  

 Joint pain 1999 

 Kidney stones 2014 

 Nerve injury  

 Obesity  

 Urolithiasis  

 Varicose veins  



Surgical History: 

Procedure Laterality Date 

 CHOLECYSTECTOMY   

 GASTRIC BYPASS   

 ORIF HUMERUS DECOMPRESSION   

 left arm 

 URETEROSCOPY   



family history includes Bleeding Disorders in his mother; Heart problem in his f
ather and mother; Osteoporosis in his mother.



Social History 



Socioeconomic History 

 Marital status:  

  Spouse name: Not on file 

 Number of children: Not on file 

 Years of education: Not on file 

 Highest education level: Not on file 

Occupational History 

 Not on file 

Tobacco Use 

 Smoking status: Never Smoker 

 Smokeless tobacco: Current User 

  Types: Chew 

 Tobacco comment: current use of smokless tobacco (chew) 

Substance and Sexual Activity 

 Alcohol use: Yes 

  Alcohol/week: 8.0 standard drinks 

  Types: 8 Cans of beer per week 

 Drug use: No 

 Sexual activity: Yes 

  Partners: Female 

  Birth control/protection: I.U.D. 

Other Topics Concern 

 Not on file 

Social History Narrative 

 Not on file 



Allergies 

Allergen Reactions 

 Carvedilol SEE COMMENTS 



Vitals: 

 21 1215 

BP: 136/69 

BP Source: Arm, Left Upper 

Pulse: 67 

Temp: 36.6 C (97.9 F) 

SpO2: 97% 

Weight: (!) 193.2 kg (426 lb) 

Height: 198.1 cm (78") 



REVIEW OF SYSTEMS: 10 point ROS obtained and negative except for back apin



PHYSICAL EXAM:

General: 51 y.o. male appears stated age, in no acute distress

HEENT: Normocephalic, atraumatic

Neck: No thyroidmegaly

Cardiovascular: Well perfused

Pulmonary: Unlabored respirations

Extremities: No cyanosis, clubbing, or edema

Skin: No lesions seen on exposed skin

Psychiatric:  Appropriate mood and affect

Musculoskeletal: No atrophy. 

Neurologic: Antigravity strength in all extremities. CN II -XII grossly intact. 
Alert and oriented x 3.   



IMPRESSION:  

1. Lumbar radiculopathy  



 

PLAN: 

Right L3 TFESI







Electronically signed by Bel Ludwig MD at 2021  3:26 PM CDT

documented in this encounter



Procedure Notes

* Bel Ludwig MD - 2021 12:30 PM CDT



Associated Order(s): Selective Nerve Rootblock/Transforaminal Lumbar/Sacral



Pre-Procedure Diagnose(s): Lumbar radiculopathy



Post-Procedure Diagnose(s): Lumbar radiculopathy



Formatting of this note is different from the original.



Attending Surgeon: Bel Ludwig MD



Anesthesia: Local



Pre-Procedure Diagnosis: 

1. Lumbar radiculopathy  



Post-Procedure Diagnosis: 

1. Lumbar radiculopathy  



Selective Nerve Rootblock/Transforaminal Lumbar/Sacral

Procedure: transforaminal epidural



Laterality: right

 on 2021 12:03 PM

Location: lumbar -  L4-5



Consent: 

Consent obtained: verbal and written

Consent given by: patient

Risks discussed: allergic reaction, bleeding, bruising, infection, nerve damage,
no change or worsening in pain and reaction to medication



Discussed with patient the purpose of the treatment/procedure, other ways of adam
ating my condition, including no treatment/ procedure and the risks and benefits
of the alternatives. Patient has decided to proceed with treatment/procedure.   




Universal Protocol:

Relevant documents: relevant documents present and verified

Test results: test results available and properly labeled

Imaging studies: imaging studies available

Required items: required blood products, implants, devices, and special equipmen
t available

Site marked: the operative site was marked

Patient identity confirmed: Patient identify confirmed verbally with patient.   




Time out: Immediately prior to procedure a "time out" was called to verify the c
orrect patient, procedure, equipment, support staff and site/side marked as requ
ired  



Procedures Details: 

Indications: pain and diagnostic evaluation 

Prep: chlorhexidine

Patient position: prone

Estimated Blood Loss: minimal

Specimens: none

Number of Joints: 1

Guidance: fluoroscopy

Contrast: Procedure confirmed with contrast under live fluoroscopy.

Needle and Epidural Catheter: quincke

Needle size: 25 G

Injection procedure: Incremental injection

Patient tolerance: Patient tolerated the procedure well with no immediate compli
cations. Pressure was applied, and hemostasis was accomplished.

Comments: DESCRIPTION OF PROCEDURE:  The procedure risks and benefits were expla
ined to the patient.  Informed consent was obtained.  The patient was placed in 
the prone position on the fluoroscopy table with a pillow under the abdomen to h
elp reduce lumbar lordosis.  Blood pressure cuff and oxygen saturation monitor w
ere attached and  the patient was monitored throughout the entire procedure.  Th
e L3 vertebral body was identified with the use of fluoroscopy in the AP view; t
he C-arm was obliqued to obtain a Ortiz view.  The right  L3 pedicle was visu
alized.  The skin was prepped using Chlorhexadine and draped in aseptic fashion.
 The C-arm was rotated slightly obliquely towards the left side to visualize the
area just below the foramen.  Skin and subcutaneous tissue were anesthetized u
sing 3 mL of 1 percent lidocaine with a 27-gauge, 1-1/2 inch needle.  Next, a 4.
69-inch, 25-gauge spinal needle was slowly advanced to the 6 o'clock position to
the right of the L3 pedicle just cephalad to the superior articular process.  T
he latter part of the needle advancement was performed with the C-arm in the lat
eral view.  When the needle tip was visualized to be in the right L3 neural fora
men, the C-arm was changed back to AP view.  Then, 0.6 mL of contrast dye was in
jected.  There was spread of dye revealing right L4 nerve root.  After negative 
aspiration, a 2 mL solution containing 7.5 mg of dexamethasone and 0.5 mL of 1 p
ercent lidocaine was injected in increments. The stylet was reinserted and then 
removed. After the procedure, the patient's blood pressure, heart rate, oxygen s
aturation, and VAS pain score were recorded in the chart.    



There were no complications.  The patient tolerated the procedure well and was b
rought to the room for observation in stable condition and discharged with writt
en discharge instructions. 



PLAN OF CARE:  The patient is to follow up in the interventional spine clinic in
3 weeks. 



The patient was advised to contact the interventional spine center for any of th
e following:  

Fever, chills, or night sweats.

New onset severe sharp pain.

Any new upper or lower extremity weakness or numbness.

Any questions regarding the procedure. 



If unable to contact the interventional spine center, the patient was instructed
to go to the local emergency room. 



Estimated blood loss: none or minimal

Specimens: none

Patient tolerated the procedure well with no immediate complications. Pressure w
as applied, and hemostasis was accomplished.







Electronically signed by Ble Ludwig MD at 2021  3:26 PM CDT

documented in this encounter



Miscellaneous Notes

* Addendum Note - Gaby Wick RN - 2021 12:30 PM CDT



 Encounter addended by: Gaby Wick RN on: 2021 2:36 PM

 Actions taken: Contacts section saved, Flowsheet accepted  



Electronically signed by Gaby Wick RN at 2021  2:36 PM CDT

documented in this encounter



Plan of Treatment





Not on filedocumented as of this encounter



Procedures





                                        Comments



                 Procedure Name  Priority        Date/Time       Associated Diag

nosis 

 

                                        



Results for this procedure are in the results section.



                 DE NJX AA&/STRD TFRML EPI  Routine         2021      Lumb

ar radiculopathy 



                           LUMBAR/SACRAL 1 LEVEL     12:03 PM CDT  



documented in this encounter



Results

* Selective Nerve Rootblock/Transforaminal Lumbar/Sacral (2021 12:03 PM 
  CDT)



 



                           Narrative                 Performed At

 

 



                           Bel Ludwig MD   2021 3:26 PM  [facility]

 [facility]



                                         Selective Nerve Rootblock/Transforamina

l Lumbar/Sacral 



                                         Procedure: transforaminal epidural 



                                         Laterality: right 



                                         on 2021 12:03 PM 



                                         Location: lumbar - L4-5 



                                         Consent: 



                                         Consent obtained: verbal and written 



                                         Consent given by: patient 



                                         Risks discussed: allergic reaction, ble

eding, bruising, infection, nerve 



                                         damage, no change or worsening in pain 

and reaction to medication 



                                         Discussed with patient the purpose of t

he treatment/procedure, other ways 



                                         of treating my condition, including no 

treatment/ procedure and the risks 



                                         and benefits of the alternatives. Leeanne

nt has decided to proceed with 



                                         treatment/procedure.   



                                         Universal Protocol: 



                                         Relevant documents: relevant documents 

present and verified 



                                         Test results: test results available an

d properly labeled 



                                         Imaging studies: imaging studies availa

ble 



                                         Required items: required blood products

, implants, devices, and special 



                                         equipment available 



                                         Site marked: the operative site was mar

shaista 



                                         Patient identity confirmed: Patient taiwo

ntify confirmed verbally with 



                                         patient.   



                                         Time out: Immediately prior to procedur

e a "time out" was called to verify 



                                         the correct patient, procedure, equipme

nt, support staff and site/side 



                                         marked as required  



                                         Procedures Details: 



                                         Indications: pain and diagnostic evalua

tion 



                                         Prep: chlorhexidine 



                                         Patient position: prone 



                                         Estimated Blood Loss: minimal 



                                         Specimens: none 



                                         Number of Joints: 1 



                                         Guidance: fluoroscopy 



                                         Contrast: Procedure confirmed with cont

rast under live fluoroscopy. 



                                         Needle and Epidural Catheter: quincke 



                                         Needle size: 25 G 



                                         Injection procedure: Incremental inject

ion 



                                         Patient tolerance: Patient tolerated th

e procedure well with no immediate 



                                         complications. Pressure was applied, an

d hemostasis was accomplished. 



                                         Comments: DESCRIPTION OF PROCEDURE: T

he procedure risks and benefits were 



                                         explained to the patient. Informed co

nsent was obtained. The patient was 



                                         placed in the prone position on the flu

oroscopy table with a pillow under 



                                         the abdomen to help reduce lumbar lordo

sis. Blood pressure cuff and 



                                         oxygen saturation monitor were attached

 and the patient was monitored 



                                         throughout the entire procedure. The 

L3 vertebral body was identified 



                                         with the use of fluoroscopy in the AP v

iew; the C-arm was obliqued to 



                                         obtain a Ortiz view. The right L

3 pedicle was visualized. The skin 



                                         was prepped using Chlorhexadine and kasia

ped in aseptic fashion. The C-arm 



                                         was rotated slightly obliquely towards 

the left side to visualize the area 



                                         just below the foramen. Skin and subc

utaneous tissue were anesthetized 



                                         using 3 mL of 1 percent lidocaine with 

a 27-gauge, 1-1/2 inch needle.  



                                         Next, a 4.69-inch, 25-gauge spinal need

le was slowly advanced to the 6 



                                         o'clock position to the right of the L3

 pedicle just cephalad to the 



                                         superior articular process. The latte

r part of the needle advancement was 



                                         performed with the C-arm in the lateral

 view. When the needle tip was 



                                         visualized to be in the right L3 neural

 foramen, the C-arm was changed 



                                         back to AP view. Then, 0.6 mL of cont

rast dye was injected. There was 



                                         spread of dye revealing right L4 nerve 

root. After negative aspiration, a 



                                         2 mL solution containing 7.5 mg of dexa

methasone and 0.5 mL of 1 percent 



                                         lidocaine was injected in increments. T

he stylet was reinserted and then 



                                         removed. After the procedure, the patie

nt's blood pressure, heart rate, 



                                         oxygen saturation, and VAS pain score w

ere recorded in the chart.   



                                         There were no complications. The leeann

ent tolerated the procedure well and 



                                         was brought to the room for observation

 in stable condition and discharged 



                                         with written discharge instructions. 



                                         PLAN OF CARE: The patient is to follo

w up in the interventional spine 



                                         clinic in 3 weeks. 



                                         The patient was advised to contact the 

interventional spine center for any 



                                         of the following:  



                                         Fever, chills, or night sweats. 



                                         New onset severe sharp pain. 



                                         Any new upper or lower extremity weakne

ss or numbness. 



                                         Any questions regarding the procedure. 



                                         If unable to contact the interventional

 spine center, the patient was 



                                         instructed to go to the local emergency

 room. 







   



                 Performing Organization  Address         City/State/ZIP Code  P

melvin Number

 

   



                                         [facility]   





documented in this encounter



Visit Diagnoses









                                         Diagnosis

 





                                         Lumbar radiculopathy



                                         Thoracic or lumbosacral neuritis or rad

iculitis, unspecified



documented in this encounter



Administered Medications





                Action Date     Dose            Rate            Site



                           Medication Order          MAR Action    

 

                2021  1:33 PM CDT 15 mg                            



                           dexamethasone PF (DECADRON) injection 15  Given    



                                         mg     



                                         15 mg, SEE ADMIN INSTRUCTIONS, ONCE, 1 

    



                                         dose, On 21 at 1330,     



                                         Preservative Free Route: Epidural     

 

  

 

                2021  1:34 PM CDT 2 mL                             



                           iohexoL (OMNIPAQUE-300) 300 mg/mL  Given    



                                         injection 2 mL     



                                         2 mL, SEE ADMIN INSTRUCTIONS, ONCE, 1  

   



                                         dose, On 21 at 1330, Route:   

  



                                         Epidural NOTE: This is a HIGH ALERT    

 



                                         Medication.     

 

  

 

                2021  1:34 PM CDT 2 mL                             



                           lidocaine PF 1% (10 mg/mL) injection 2  Given    



                                         mL     



                                         2 mL, Injection, ONCE, 1 dose, On 21 at 1330     

 

  



documented in this encounter



Additional Health Concerns





 



                           Assessment                Noted Time

 

 



                           A fall risk assessment has been completed for the pat

ient  2021 10:16 AM 

CDT



documented as of this encounter

## 2021-10-27 NOTE — DIAGNOSTIC IMAGING REPORT
INDICATION: Knee pain. Knee popped while getting out of car.



No comparison is available.



FINDINGS:  There is medial compartment joint space narrowing

compatible with underlying osteoarthritic change. There are small

ossific densities projecting over the joint space that may

reflect loose intra-articular bodies. There may be trace knee

joint effusion. There is no definable fracture or suspicious bone

lesion.



IMPRESSION: Joint space loss within the medial compartment of

bilateral osteoarthritic joint space loss. There are possible

ossific loose bodies within the knee joint with a trace effusion.

There is no malalignment or evidence of an acute fracture.



Dictated by: 



  Dictated on workstation # [id number]

## 2021-10-27 NOTE — XMS REPORT
Clinical Summary

                             Created on: 10/27/2021



DaljitOnel

External Reference #: [id number]

: 1970

Sex: Male



Demographics





                          Address                   [address] [place]  [address]

 

                          Home Phone                [phone]

 

                          Preferred Language        English

 

                          Marital Status            

 

                          Hinduism Affiliation     Unknown

 

                          Race                      White

 

                          Ethnic Group              Not  or 





Author





                          Author                    [facility]

 

                          Organization              [facility]

 

                          Address                   Unknown

 

                          Phone                     Unavailable







Support





                Name            Relationship    Address         Phone

 

                    Natalie Bocanegra  [employer]                [address] [place]  [address]                       [phone]







Care Team Providers





                    Care Team Member Name Role                Phone

 

                    Mary Hong Unavailable         [phone]

[phone]

 

                    Ana Paula Lockwood RN Unavailable         Unavailable

 

                    Nura Mosqueda MD Unavailable         [phone]

 

                    Herbert Medeiros MD    Unavailable         Unavailable

 

                    Olivier GomezC PCP                 [phone]







Source Comments

Some departments are not documenting in the electronic medical record.  If you d
o not see the information that you expected, contact Release of Information in [facility] Information Management department at [phone] for further assistan
ce in locating additional records.[facility]



Allergies





                                        Comments



                 Active Allergy  Reactions       Severity        Noted Date 

 

                                         



                 Carvedilol      SEE COMMENTS    Low             2016 







Medications





                          End Date                  Status



              Medication   Sig          Dispensed    Refills      Start  



                                         Date  

 

                                                    Active



                 furosemide (LASIX) 40 mg  Take 40 mg by   0                 



                     tablet              mouth daily.        0  

 

                                                    Active



                 pantoprazole DR  Take 40 mg by   0                 



                     (PROTONIX) 40 mg tablet  mouth daily.        0  

 

                                                    Active



                     Verification of Patch    0                   10/01/201  



                           Placement and Integrity -     9  



                                         Lidocaine 5%      

 

                                                    Active



                 amitriptyline (ELAVIL) 50  Take  by        0                 



                     mg tablet           mouth at            7  



                                         bedtime     



                                         daily.     

 

                                                    Active



                 lisinopriL (ZESTRIL) 10  Take  by        0                 



                     mg tablet           mouth daily.        7  

 

                                                    Active



              tiZANidine (ZANAFLEX) 4  Take one-half  90 tablet    3            

  



                     mg tablet           tablet to one       0  



                                         tablet by     



                                         mouth twice     



                                         daily as     



                                         needed.     

 

                                                    Active



                 VRAYLAR 3 mg cap  1.5 mg. 1.5     0                 



                           MG                        0  

 

                                                    Active



                     duloxetine DR (CYMBALTA)  Take 30 mg by       0   



                           30 mg capsule             mouth twice     



                                         daily.     

 

                                                    Active



                 pregabalin (LYRICA) 75 mg  Take 150 mg     0                 



                     capsule             by mouth            1  



                                         twice daily.     

 

                                                    Active



                     vilazodone (VIIBRYD) 20  Take 40 mg by       0   



                           mg tablet                 mouth daily.     

 

                                                    Active



              celecoxib (CELEBREX) 100  TAKE ONE (1)  180 capsule  0            

  



                     mg capsule          CAPSULE BY          1  



                                         MOUTH TWICE     



                                         DAILY     

 

                                                    Active



              HYDROcodone/acetaminophen  Take one     45 tablet    0            

  



                     (NORCO) 10/325 mg tablet  tablet by           1  



                                         mouth every 6     



                                         hours as     



                                         needed for     



                                         Pain     

 

                                                    Active



              HYDROcodone/acetaminophen  Take one     30 tablet    0            

10/18/202  



                     (NORCO) 10/325 mg tablet  tablet by           1  



                                         mouth every     



                                         12 hours as     



                                         needed for     



                                         Pain     

 

                          10/18/2021                Discontinued



              HYDROcodone/acetaminophen  Take one     30 tablet    0            

  



                     (NORCO) 10/325 mg tablet  tablet by           1  



                                         mouth every 6     



                                         hours as     



                                         needed for     



                                         Pain     

 

                          10/18/2021                Discontinued



              HYDROcodone/acetaminophen  Take one     30 tablet    0            

10/26/202  



                     (NORCO) 10/325 mg tablet  tablet by           1  



                                         mouth every 6     



                                         hours as     



                                         needed for     



                                         Pain     







Active Problems





 



                           Problem                   Noted Date

 

 



                           DDD (degenerative disc disease), lumbar  2021

 

 



                           Lumbar polyradiculopathy  2021

 

 



                           Nephrolithiasis           2014







Encounters





                          Care Team                 Description



                     Date                Type                Specialty  

 

                                        



Hue Tian APRN-NP                S/P epidural steroid injection (Primary 

Dx); 

Lumbar radiculopathy; 

Medication management



                     10/18/2021          Office Visit        Rehabilitation Medi

cine  



                                         Telehealth   

 

                                                     



                           10/18/2021                Travel   

 

                                        



Hue Tian APRN-NP                 



                     10/18/2021          Refill              Rehabilitation Medi

cine  

 

                                        



Bel Ludwig MD                     



                     2021          Hospital            Radiology  



                                         Encounter   

 

                                        



Bel Ludwig MD                     



                     2021          Hospital            Anesthesia Pain  



                                         Encounter   

 

                                                     



                           2021                Travel   

 

                                        



Lian Diaz MD                  Lumbar polyradiculopathy (Primary Dx); 

DDD (degenerative disc disease), lumbar



                     2021          Office Visit        Rehabilitation Medi

cine  

 

                                                     



                           2021                Travel   

 

                                        



Hue Tian APRN-NP                Lumbar radiculopathy (Primary Dx); 

Chronic right-sided low back pain without sciatica; 

Medication management; 

S/P epidural steroid injection



                     2021          Office Visit        Rehabilitation Medi

cine  

 

                                                     



                           2021                Travel   

 

                                        



Hue Tian APRN-CALISTA                 



                     2021          Refill              Rehabilitation [facility]  



from Last 3 Months



Immunizations





  



                     Name                Administration Dates  Next Due

 

  



                           COVID-19 (AUGUSTIN &       2021 



                                         AUGUSTIN/ASHTYN)  



                                         Recombinant Protein VACC,  



                                         0.5 mL (PF)  

 

  



                           Flu vaccine, inj          10/24/2019 



                                         unspecified (Historical)  

 

  



                           MMR Vaccine               2008 







Surgical History





   



                 Surgery         Date            Site/Laterality  Comments

 

   



                           ORIF HUMERUS              left arm



                                         DECOMPRESSION   

 

   



                                         GASTRIC BYPASS   

 

   



                                         CHOLECYSTECTOMY   

 

   



                                         URETEROSCOPY   







Medical History





  



                     Medical History     Date                Comments

 

  



                                         Depression  

 

  



                           Deafness                  left ear

 

  



                                         Obesity  

 

  



                                         Urolithiasis  

 

  



                           Anxiety                    

 

  



                           Joint pain                1999 

 

  



                                         Nerve injury  

 

  



                                         Varicose veins  

 

  



                                         Degenerative disc disease, cervical  

 

  



                                         Degenerative disc disease, lumbar  

 

  



                                         Degenerative disc disease, thoracic  

 

  



                           Kidney stones             2014 

 

  



                                         Cardiomyopathy (HCC)  







Family History





   



                 Medical History  Relation        Name            Comments

 

   



                     Heart problem       Father              Gian 

 

   



                 Bleeding Disorders  Mother          Al           HX blood cl

ots

 

   



                     Heart problem       Mother              Al 

 

   



                     Osteoporosis        Mother              Al 







   



                 Relation        Name            Status          Comments

 

   



                           Father                    Gian  

 

   



                           Mother                    Al  







Social History





                                        Date



                 Tobacco Use     Types           Packs/Day       Years Used 

 

                                         



                     Never Smoker        0                   0 

 

    



                           Smokeless Tobacco:        Chew  



                                         Current User   







                                        Comments: current use of smokless tobacc

o (chew)







                                        Comments



                           Alcohol Use               Standard Drinks/Week 

 

                                         



                           Yes                       8 (1 standard drink = 0.6 o

z pure alcohol) 







  



                     Alcohol Habits      Answer              Date Recorded

 

  



                           How often do you have a drink containing alcohol?  No

t asked 

 

  



                           How many drinks containing alcohol do you have on  No

t asked 



                                         a typical day when you are drinking?  

 

  



                     How often do you have six or more drinks on one  Weekly    

          2020



                                         occasion?  

 

  



                           Comment:                  Not asked 







 



                           Sex Assigned at Birth     Date Recorded

 

 



                           Male                      2020  3:37 PM CDT







                                        Date Recorded



                           COVID-19 Exposure         Response 

 

                                        10/18/2021  2:45 PM CDT



                           In the last month, have you been in contact with  No 

/ Unsure 



                                         someone who was confirmed or suspected 

to have  



                                         Coronavirus / COVID-19?  







Last Filed Vital Signs





                    Reading             Time Taken          Comments



                                         Vital Sign   

 

                    121/73              2021  1:45 PM CDT  



                                         Blood Pressure   

 

                    97                  2021  1:31 PM CDT  



                                         Pulse   

 

                    36.6 C (97.9 F) 2021 12:15 PM CDT  



                                         Temperature   

 

                    16                  2021  1:25 PM CDT  



                                         Respiratory Rate   

 

                    100%                2021  1:45 PM CDT  



                                         Oxygen Saturation   

 

                    -                   -                    



                                         Inhaled Oxygen   



                                         Concentration   

 

                    194.6 kg (429 lb)   10/18/2021  2:43 PM CDT  



                                         Weight   

 

                    198.1 cm (6' 6")    10/18/2021  2:43 PM CDT  



                                         Height   

 

                    49.58               10/18/2021  2:43 PM CDT  



                                         Body Mass Index   







Plan of Treatment





   



                 Health Maintenance  Due Date        Last Done       Comments

 

   



                           HIV SCREENING             1987  

 

   



                           DTAP/TDAP VACCINES (1 -   1990  



                                         Tdap)   

 

   



                           HEPATITIS C SCREENING     1990  

 

   



                           PHYSICAL (COMPREHENSIVE)  1990  



                                         EXAM   

 

   



                     INFLUENZA VACCINE   2021          10/24/2019 

 

   



                     COVID-19 VACCINE    Completed           2021 







Procedures





                                        Comments



                 Procedure Name  Priority        Date/Time       Associated Diag

nosis 

 

                                        



Results for this procedure are in the results section.



                     FLUORO GUIDANCE FOR SPINE  Routine             2021  



                           INJ RAD                   1:36 PM CDT  

 

                                        



Results for this procedure are in the results section.



                 MI NJX AA&/STRD TFRML EPI  Routine         2021      Lumb

ar radiculopathy 



                           LUMBAR/SACRAL 1 LEVEL     12:03 PM CDT  

 

                                        



Results for this procedure are in the results section.



                 MI NERVE CONDUCTION  Routine         2021      DDD (degen

erative disc 



                     STUDIES 3-4 STUDIES   1:30 PM CDT         disease), lumbar 



                                         Lumbar radicular pain 



                                         Chronic midline low back 



                                         pain with sciatica, 



                                         sciatica laterality 



                                         unspecified 



                                         Tobacco use disorder 



                                         BMI 45.0-49.9, adult 



                                         (HCC) 



                                         Foraminal stenosis of 



                                         lumbar region 

 

                                        



Results for this procedure are in the results section.



                 MI NDL EMG 1 XTR W/WO  Routine         2021      DDD (deg

enerative disc 



                     RELATED PARASPINAL AREAS   1:30 PM CDT         disease), mihai

mbar 



                                         Lumbar radicular pain 



                                         Chronic midline low back 



                                         pain with sciatica, 



                                         sciatica laterality 



                                         unspecified 



                                         Tobacco use disorder 



                                         BMI 45.0-49.9, adult 



                                         (HCC) 



                                         Foraminal stenosis of 



                                         lumbar region 



from Last 3 Months



Results

* FLUORO GUIDANCE FOR SPINE INJ RAD (2021  1:36 PM CDT)







                                         Specimen

 









 



                           Narrative                 Performed At

 

 



                           This order has been auto finalized and does not conta

in a result.  [facility]







   



                 Performing Organization  Address         City/State/ZIP Code  P

melvin Number

 

   



                                         [facility]   





* Selective Nerve Rootblock/Transforaminal Lumbar/Sacral (2021 12:03 PM 
  CDT)



 



                           Narrative                 Performed At

 

 



                           Bel Ludwig MD   2021 3:26 PM  [facility]

 OUTSIDE LAB



                                         Selective Nerve Rootblock/Transforamina

l Lumbar/Sacral 



                                         Procedure: transforaminal epidural 



                                         Laterality: right 



                                         on 2021 12:03 PM 



                                         Location: lumbar - L4-5 



                                         Consent: 



                                         Consent obtained: verbal and written 



                                         Consent given by: patient 



                                         Risks discussed: allergic reaction, ble

eding, bruising, infection, nerve 



                                         damage, no change or worsening in pain 

and reaction to medication 



                                         Discussed with patient the purpose of t

he treatment/procedure, other ways 



                                         of treating my condition, including no 

treatment/ procedure and the risks 



                                         and benefits of the alternatives. Patie

nt has decided to proceed with 



                                         treatment/procedure.   



                                         Universal Protocol: 



                                         Relevant documents: relevant documents 

present and verified 



                                         Test results: test results available an

d properly labeled 



                                         Imaging studies: imaging studies availa

ble 



                                         Required items: required blood products

, implants, devices, and special 



                                         equipment available 



                                         Site marked: the operative site was mar

ked 



                                         Patient identity confirmed: Patient taiwo

ntify confirmed verbally with 



                                         patient.   



                                         Time out: Immediately prior to procedur

e a "time out" was called to verify 



                                         the correct patient, procedure, equipme

nt, support staff and site/side 



                                         marked as required  



                                         Procedures Details: 



                                         Indications: pain and diagnostic evalua

tion 



                                         Prep: chlorhexidine 



                                         Patient position: prone 



                                         Estimated Blood Loss: minimal 



                                         Specimens: none 



                                         Number of Joints: 1 



                                         Guidance: fluoroscopy 



                                         Contrast: Procedure confirmed with cont

rast under live fluoroscopy. 



                                         Needle and Epidural Catheter: quincke 



                                         Needle size: 25 G 



                                         Injection procedure: Incremental inject

ion 



                                         Patient tolerance: Patient tolerated th

e procedure well with no immediate 



                                         complications. Pressure was applied, an

d hemostasis was accomplished. 



                                         Comments: DESCRIPTION OF PROCEDURE: T

he procedure risks and benefits were 



                                         explained to the patient. Informed co

nsent was obtained. The patient was 



                                         placed in the prone position on the flu

oroscopy table with a pillow under 



                                         the abdomen to help reduce lumbar lordo

sis. Blood pressure cuff and 



                                         oxygen saturation monitor were attached

 and the patient was monitored 



                                         throughout the entire procedure. The 

L3 vertebral body was identified 



                                         with the use of fluoroscopy in the AP v

iew; the C-arm was obliqued to 



                                         obtain a Ortiz view. The right L

3 pedicle was visualized. The skin 



                                         was prepped using Chlorhexadine and kasia

ped in aseptic fashion. The C-arm 



                                         was rotated slightly obliquely towards 

the left side to visualize the area 



                                         just below the foramen. Skin and subc

utaneous tissue were anesthetized 



                                         using 3 mL of 1 percent lidocaine with 

a 27-gauge, 1-1/2 inch needle.  



                                         Next, a 4.69-inch, 25-gauge spinal need

le was slowly advanced to the 6 



                                         o'clock position to the right of the L3

 pedicle just cephalad to the 



                                         superior articular process. The latte

r part of the needle advancement was 



                                         performed with the C-arm in the lateral

 view. When the needle tip was 



                                         visualized to be in the right L3 neural

 foramen, the C-arm was changed 



                                         back to AP view. Then, 0.6 mL of cont

rast dye was injected. There was 



                                         spread of dye revealing right L4 nerve 

root. After negative aspiration, a 



                                         2 mL solution containing 7.5 mg of dexa

methasone and 0.5 mL of 1 percent 



                                         lidocaine was injected in increments. T

he stylet was reinserted and then 



                                         removed. After the procedure, the patie

nt's blood pressure, heart rate, 



                                         oxygen saturation, and VAS pain score w

ere recorded in the chart.   



                                         There were no complications. The saji

ent tolerated the procedure well and 



                                         was brought to the room for observation

 in stable condition and discharged 



                                         with written discharge instructions. 



                                         PLAN OF CARE: The patient is to follo

w up in the interventional spine 



                                         clinic in 3 weeks. 



                                         The patient was advised to contact the 

interventional spine center for any 



                                         of the following:  



                                         Fever, chills, or night sweats. 



                                         New onset severe sharp pain. 



                                         Any new upper or lower extremity weakne

ss or numbness. 



                                         Any questions regarding the procedure. 



                                         If unable to contact the interventional

 spine center, the patient was 



                                         instructed to go to the local emergency

 room. 







   



                 Performing Organization  Address         City/State/ZIP Code  P

melvin Number

 

   



                                         OTHER OUTSIDE LAB   





* EMG PROCEDURE (2021  1:30 PM CDT)



 



                           Narrative                 Performed At

 

 



                           This result has an attachment that is not available. 

 OTHER OUTSIDE LAB



                                         Lian Diaz MD   2021 

2:08 PM 



                                         Electrodiagnostic Laboratory Report 



                                         Impression: 



                                         ABNORMAL 



                                         1. There is electrodiagnostic evidence 

of acute on chronic right 



                                         L4-S1 polyradiculopathy. 



                                         2. There is no evidence of a right tibi

al or fibular 



                                         mononeuropathy. 



                                         3. There is no evidence of peripheral p

olyneuropathy. 



                                         Clinical Correlation: 



                                         Follow-up with referring physician. ALMA proctor the progression of the 



                                         radiculopathy since last EMG/NCS dated 

2020, repeat imaging 



                                         may be warranted. 



                                         Thank you for allowing me to perform el

ectrodiagnostic testing on 



                                         your patients. A full EMG/NCS report 

will be scanned into 



                                         O2/EPIC, including waveforms. If you 

have any further questions 



                                         or comments, please do not hesitate to 

call. 



                                         Lian Diaz MD 



                                         Diplomate, American Board of Physical M

edicine and Rehabilitation 



                                         Diplomate, American Association of Neur

omuscular and 



                                         Electrodiagnostic Medicine 







                                        Procedure Note

 

                                        



Lian Diaz MD - 2021  1:30 PM CDT



Formatting of this note might be different from the original.

Electrodiagnostic Laboratory Report



Impression:

ABNORMAL



                                        1. There is electrodiagnostic evidence o

f acute on chronic right L4-S1 

polyradiculopathy.

                                        2. There is no evidence of a right tibia

l or fibular mononeuropathy.

                                        3. There is no evidence of peripheral po

lyneuropathy. 



Clinical Correlation:

Follow-up with referring physician.  Given the progression of the radiculopathy 
since last EMG/NCS dated 2020, repeat imaging may be warranted.



Thank you for allowing me to perform electrodiagnostic testing on your patients.
 A full EMG/NCS report will be scanned into O2/EPIC, including waveforms.  If 
you have any further questions or comments, please do not hesitate to call.



Lian Diaz MD

Diplomate, American Board of Physical Medicine and Rehabilitation

Diplomate, American Association of Neuromuscular and Electrodiagnostic Medicine









   



                 Performing Organization  Address         City/State/ZIP Code  P

melvin Number

 

   



                                         OTHER OUTSIDE LAB   





from Last 3 Months



Insurance





                                        Type



            Payer      Benefit    Subscriber ID  Effective  Phone      Address 



                           Plan /                    Dates   



                                         Group     

 

                                        PPO



                 [facility]         [id number]    2021-1   



                           PREF CARE                    



                                         BLUE     

 

                                        HMO



                 [employer]           TRUSTMARK       [id number]     2020-P   



                           [employer]                     resent   







     



            Guarantor Name  Account    Relation to  Date of    Phone      Pop

g Address



                     Type                Patient             Birth  

 

     



            Onel Bocanegra  Personal/F  Self       1970  [phone]  [address]



                     [facility]               (Home)              [place] [id number]

05







Advance Directives





                          Patient Representative    Explanation



                           Type                      Date Recorded  

 

                                                     



                                         Advance   



                                         Directive/DPOA   











                          Date Inactivated          Comments



                           Code Status               Date Activated  

 

                          2014  5:23 PM         



                           Full Code                 2014 10:26 PM  







  



                           Provider has discussed Code Status  No, more discussi

on 



                           w/Patient or Family?      needed

## 2021-10-27 NOTE — XMS REPORT
Encounter Summary

                             Created on: 10/27/2021



Silas Bocanegrajohn Michael

External Reference #: [id number]

: 1970

Sex: Male



Demographics





                          Address                   [address] [place]  [address]

 

                          Home Phone                [phone]

 

                          Preferred Language        English

 

                          Marital Status            

 

                          Confucianism Affiliation     Unknown

 

                          Race                      White

 

                          Ethnic Group              Not  or 





Author





                          Author                    [facility]

 

                          Organization              [facility]

 

                          Address                   Unknown

 

                          Phone                     Unavailable







Support





                Name            Relationship    Address         Phone

 

                    Natalie Bocanegra  ECON                [address] [place]  [address]                       [phone]







Care Team Providers





                    Care Team Member Name Role                Phone

 

                    HalEddyMarypablo SANDERS Unavailable         [phone]

[phone]

 

                    Ana Paula Lockwood RN Unavailable         Unavailable

 

                    Nura Mosqueda MD Unavailable         [phone]

 

                    Herbert Medeiros MD    Unavailable         Unavailable

 

                    Olivier Gomez PA-C PCP                 [phone]







Reason for Referral

* Radiology Services (Routine)



                          Referred By Contact       Referred To Contact



                 Status          Reason          Specialty       Diagnoses /  



                                         Procedures  

 

                                        



Bel Ludwig MD



[address]







[place] [address]



Phone: [phone]



Fax: [phone]                       







                     New Request         Radiology           Procedures  



                                         [facility] GUIDANCE  



                                         FOR SPINE INJ RAD  









Electronically signed by Bel Ludwig MD at 





Reason for Visit

* Radiology Services (Routine)



                          Referred By Contact       Referred To Contact



                 Status          Reason          Specialty       Diagnoses /  



                                         Procedures  

 

                                        



Bel Ludwig MD



[address]







[place] [address]



Phone: [phone]



Fax: [phone]                       







                     New Request         Radiology           Procedures  



                                         FLUORO GUIDANCE  



                                         FOR SPINE INJ RAD  











Encounter Details





                          Care Team                 Description



                     Date                Type                Department  

 

                                        



Bel Ludwig MD



[address]







[place] [address]



[phone] (Fax)                       



                     2021          Hospital            Imaging: [facility] [place] [facility]

ek,  



                           Encounter                 [facility]  



                                         [address]  



                                         [address]  



                                         [place]  



                                         [id number] [phone]  







Social History





                                        Date



                 Tobacco Use     Types           Packs/Day       Years Used 

 

                                         



                     Never Smoker        0                   0 

 

    



                           Smokeless Tobacco:        Chew  



                                         Current User   







                                        Comments: current use of smokless tobacc

o (chew)







                                        Comments



                           Alcohol Use               Standard Drinks/Week 

 

                                         



                           Yes                       8 (1 standard drink = 0.6 o

z pure alcohol) 







  



                     Alcohol Habits      Answer              Date Recorded

 

  



                           How often do you have a drink containing alcohol?  No

t asked 

 

  



                           How many drinks containing alcohol do you have on  No

t asked 



                                         a typical day when you are drinking?  

 

  



                     How often do you have six or more drinks on one  Weekly    

          2020



                                         occasion?  

 

  



                           Comment:                  Not asked 







 



                           Sex Assigned at Birth     Date Recorded

 

 



                           Male                      2020  3:37 PM CDT







                                        Date Recorded



                           COVID-19 Exposure         Response 

 

                                        2021 12:02 PM CDT



                           In the last month, have you been in contact with  No 

/ Unsure 



                                         someone who was confirmed or suspected 

to have  



                                         Coronavirus / COVID-19?  



documented as of this encounter



Functional Status





                                        Date of Assessment



                           Functional Status         Response 

 

                                        2021



                           Does the patient have a hearing impairment:  No 

 

                                        2021



                           Does the patient have a visual impairment:  Yes 

 

                                        2021



                           Does the patient have impaired ambulation:  Yes 

 

                                        2021



                           Does the patient have an activity of daily living  No

 



                                         (ADL) impairment:  

 

                                        2021



                           Does the patient have an instrumental activity of  Ye

s 



                                         daily living (IADL) impairment:  







                                        Date of Assessment



                           Cognitive Status          Response 

 

                                        2021



                           Does the patient have a cognitive impairment:  No 



documented as of this encounter



Medications at Time of Discharge





                          Start Date                End Date



                 Medication      Sig             Dispensed       Refills  

 

                          2017                 



                     amitriptyline (ELAVIL) 50  Take  by            0  



                           mg tablet                 mouth at    



                                         bedtime    



                                         daily.    

 

                          2021                 



                 celecoxib (CELEBREX) 100  TAKE ONE (1)    180 capsule     0  



                           mg capsule                CAPSULE BY    



                                         MOUTH TWICE    



                                         DAILY    

 

                                                     



                     duloxetine DR (CYMBALTA)  Take 30 mg by       0  



                           30 mg capsule             mouth twice    



                                         daily.    

 

                          2020                 



                     furosemide (LASIX) 40 mg  Take 40 mg by       0  



                           tablet                    mouth daily.    

 

                          2021                 



                 HYDROcodone/acetaminophen  Take one        45 tablet       0  



                           (NORCO) 10/325 mg tablet  tablet by    



                                         mouth every 6    



                                         hours as    



                                         needed for    



                                         Pain    

 

                          2017                 



                     lisinopriL (ZESTRIL) 10  Take  by            0  



                           mg tablet                 mouth daily.    

 

                          2020                 



                     pantoprazole DR     Take 40 mg by       0  



                           (PROTONIX) 40 mg tablet   mouth daily.    

 

                          2021                 



                     pregabalin (LYRICA) 75 mg  Take 150 mg         0  



                           capsule                   by mouth    



                                         twice daily.    

 

                          2020                 



                 tiZANidine (ZANAFLEX) 4  Take one-half   90 tablet       3  



                           mg tablet                 tablet to one    



                                         tablet by    



                                         mouth twice    



                                         daily as    



                                         needed.    

 

                          10/01/2019                 



                           Verification of Patch     0  



                                         Placement and Integrity -     



                                         Lidocaine 5%     

 

                                                     



                     vilazodone (VIIBRYD) 20  Take 40 mg by       0  



                           mg tablet                 mouth daily.    

 

                          2020                 



                     VRAYLAR 3 mg cap    1.5 mg. 1.5         0  



                                         MG    

 

                          2021                10/18/2021



                 HYDROcodone/acetaminophen  Take one        30 tablet       0  



                           (NORCO) 10/325 mg tablet  tablet by    



                                         mouth every 6    



                                         hours as    



                                         needed for    



                                         Pain    

 

                          10/26/2021                10/18/2021



                 HYDROcodone/acetaminophen  Take one        30 tablet       0  



                           (NORCO) 10/325 mg tablet  tablet by    



                                         mouth every 6    



                                         hours as    



                                         needed for    



                                         Pain    



documented as of this encounter



Discharge Disposition





                    Code                Departure Means     Destination



                                         Disposition   

 

                                                            Home



                                         Home or Self Care   



documented in this encounter



Plan of Treatment





Not on filedocumented as of this encounter



Procedures





                                        Comments



                 Procedure Name  Priority        Date/Time       Associated Diag

nosis 

 

                                        



Results for this procedure are in the results section.



                     FLUORO GUIDANCE FOR SPINE  Routine             2021  



                           INJ RAD                   1:36 PM CDT  



documented in this encounter



Results

* FLUORO GUIDANCE FOR SPINE INJ RAD (2021  1:36 PM CDT)







                                         Specimen

 









 



                           Narrative                 Performed At

 

 



                           This order has been auto finalized and does not conta

in a result.  [facility]







   



                 Performing Organization  Address         City/State/ZIP Code  P

melvin Number

 

   



                                         [facility]   





documented in this encounter



Visit Diagnoses

Not on filedocumented in this encounter



Additional Health Concerns





 



                           Assessment                Noted Time

 

 



                           A fall risk assessment has been completed for the pat

ient  2021 10:16 AM 

CDT



documented as of this encounter

## 2021-10-27 NOTE — XMS REPORT
Encounter Summary

                             Created on: 10/27/2021



Onel Bocanegra

External Reference #: [id number]

: 1970

Sex: Male



Demographics





                          Address                   [address] [place]  [address]

 

                          Home Phone                [phone]

 

                          Preferred Language        English

 

                          Marital Status            

 

                          Shinto Affiliation     Unknown

 

                          Race                      White

 

                          Ethnic Group              Not  or 





Author





                          Author                    [facility]

 

                          Organization              [facility]

 

                          Address                   Unknown

 

                          Phone                     Unavailable







Support





                Name            Relationship    Address         Phone

 

                    Natalie Bocanegra  EC[employer]                [address] [place]  [address]                       [phone]







Care Team Providers





                    Care Team Member Name Role                Phone

 

                    Mary Hong Unavailable         [phone]

[phone]

 

                    Ana Paula Lockwood RN Unavailable         Unavailable

 

                    Nura Mosqueda MD Unavailable         [phone]

 

                    Herbert Medeiros MD    Unavailable         Unavailable

 

                    Olivier Gomez PA-C PCP                 [phone]







Encounter Details





                          Care Team                 Description



                     Date                Type                Department  

 

                                                     



                           10/18/2021                Travel   







Social History





                                        Date



                 Tobacco Use     Types           Packs/Day       Years Used 

 

                                         



                     Never Smoker        0                   0 

 

    



                           Smokeless Tobacco:        Chew  



                                         Current User   







                                        Comments: current use of smokless tobacc

o (chew)







                                        Comments



                           Alcohol Use               Standard Drinks/Week 

 

                                         



                           Yes                       8 (1 standard drink = 0.6 o

z pure alcohol) 







  



                     Alcohol Habits      Answer              Date Recorded

 

  



                           How often do you have a drink containing alcohol?  No

t asked 

 

  



                           How many drinks containing alcohol do you have on  No

t asked 



                                         a typical day when you are drinking?  

 

  



                     How often do you have six or more drinks on one  Weekly    

          2020



                                         occasion?  

 

  



                           Comment:                  Not asked 







 



                           Sex Assigned at Birth     Date Recorded

 

 



                           Male                      2018  3:37 PM CDT







                                        Date Recorded



                           COVID-19 Exposure         Response 

 

                                        10/18/2021  2:45 PM CDT



                           In the last month, have you been in contact with  No 

/ Unsure 



                                         someone who was confirmed or suspected 

to have  



                                         Coronavirus / COVID-19?  



documented as of this encounter



Functional Status





                                        Date of Assessment



                           Functional Status         Response 

 

                                        2021



                           Does the patient have a hearing impairment:  No 

 

                                        2021



                           Does the patient have a visual impairment:  Yes 

 

                                        2021



                           Does the patient have impaired ambulation:  Yes 

 

                                        2021



                           Does the patient have an activity of daily living  No

 



                                         (ADL) impairment:  

 

                                        2021



                           Does the patient have an instrumental activity of  Ye

s 



                                         daily living (IADL) impairment:  







                                        Date of Assessment



                           Cognitive Status          Response 

 

                                        2021



                           Does the patient have a cognitive impairment:  No 



documented as of this encounter



Plan of Treatment





Not on filedocumented as of this encounter



Visit Diagnoses

Not on filedocumented in this encounter



Additional Health Concerns





 



                           Assessment                Noted Time

 

 



                           A fall risk assessment has been completed for the pat

ient  2021 10:16 AM 

CDT



documented as of this encounter

## 2021-11-12 NOTE — HISTORY AND PHYSICAL
DATE OF SERVICE:  11/17/2021



ADMISSION HISTORY AND PHYSICAL



DATE OF ADMISSION:  This will be for outpatient surgery for right knee

arthroscopy on 11/10/2021.



HISTORY OF PRESENT ILLNESS:

The patient is a 51-year-old gentleman with longstanding right knee pain.  He

reports over the last few months, he has had increasing pain and stiffness in

his right knee.  He had an acute injury a week ago where he stepped awkwardly

and felt a pop in the right knee.  He has marked increase in back pain and

swelling.  He has had to ambulate with a crutch following this injury.  Due to

functional impairment and failure to improve with conservative measures and

progressive symptoms, the patient has elected to proceed with surgical

intervention.



REVIEW OF SYSTEMS:

No chest pain, no shortness of breath, no dysuria.



PAST MEDICAL HISTORY:

Lumbar spine, depression, kidney stones, morbid obesity, cardiomyopathy,

obstructive sleep apnea, chronic knee pain, varicose veins, neuropathy, hearing

loss and ventral hernia.



PAST SURGICAL HISTORY:

ORIF left elbow, gastric bypass Mariah-en-Y, kidney stone, cholecystectomy, left

knee arthroscopy.



FAMILY HISTORY:

Significant for obesity, hypertension, congestive heart failure, Crohn's

disease, rheumatoid arthritis, diabetes mellitus.



PRIMARY CARE PROVIDER:

[facility].



MEDICATIONS:

Zanaflex, Lyrica, Viibryd, sildenafil, furosemide, Vraylar, pantoprazole,

lisinopril, Celebrex.



ALLERGIES:

COREG.



SOCIAL HISTORY:

The patient drinks alcohol socially.  Never used tobacco.



RADIOGRAPHS:

Reveal moderate to severe patellofemoral joint space narrowing.



PHYSICAL EXAMINATION:

GENERAL:  The patient is well-developed, well-nourished, in no acute distress.

HEENT:  Normocephalic, atraumatic.  Pupils are equal, round and reactive to

light.  Oropharynx is clear.

NECK:  Supple, no lymphadenopathy.

LUNGS:  Clear to auscultation bilaterally.

HEART:  Regular rate and rhythm.

ABDOMEN:  Soft, nontender, nondistended.

EXTREMITIES:  The right knee demonstrates a 2+ effusion.  He has no warmth or

erythema.  He is able to straight leg raise.  Range of motion of 0/0/100.  He is

tender along his medial and lateral joint lines and has pain medially with

Vitaliy's.  Negative anterior and posterior drawer.  No varus or valgus laxity

noted.



IMPRESSION:

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



PLAN:

Right knee arthroscopy with partial meniscectomy and chondroplasty.  The risks,

benefits, options, ramifications and recovery have been discussed at length with

the patient.  He understands and wishes to proceed.





Job ID: [id number]

DocumentID: [id number]

Dictated Date:  11/01/2021 11:02:23

Transcription Date: 11/01/2021 11:29:28

Dictated By: GRISEL SILVA MD

## 2021-11-15 ENCOUNTER — HOSPITAL ENCOUNTER (OUTPATIENT)
Dept: HOSPITAL 75 - PREOP | Age: 51
Discharge: HOME | End: 2021-11-15
Attending: ORTHOPAEDIC SURGERY
Payer: COMMERCIAL

## 2021-11-15 VITALS — BODY MASS INDEX: 36.82 KG/M2 | WEIGHT: 315 LBS | HEIGHT: 77.56 IN

## 2021-11-15 DIAGNOSIS — Z01.818: Primary | ICD-10-CM

## 2021-11-17 ENCOUNTER — HOSPITAL ENCOUNTER (OUTPATIENT)
Dept: HOSPITAL 75 - SDC | Age: 51
End: 2021-11-17
Attending: ORTHOPAEDIC SURGERY
Payer: COMMERCIAL

## 2021-11-17 VITALS — SYSTOLIC BLOOD PRESSURE: 144 MMHG | DIASTOLIC BLOOD PRESSURE: 95 MMHG

## 2021-11-17 VITALS — SYSTOLIC BLOOD PRESSURE: 152 MMHG | DIASTOLIC BLOOD PRESSURE: 96 MMHG

## 2021-11-17 VITALS — WEIGHT: 315 LBS | HEIGHT: 77.56 IN | BODY MASS INDEX: 36.82 KG/M2

## 2021-11-17 VITALS — SYSTOLIC BLOOD PRESSURE: 139 MMHG | DIASTOLIC BLOOD PRESSURE: 86 MMHG

## 2021-11-17 VITALS — SYSTOLIC BLOOD PRESSURE: 140 MMHG | DIASTOLIC BLOOD PRESSURE: 87 MMHG

## 2021-11-17 VITALS — DIASTOLIC BLOOD PRESSURE: 76 MMHG | SYSTOLIC BLOOD PRESSURE: 138 MMHG

## 2021-11-17 VITALS — SYSTOLIC BLOOD PRESSURE: 119 MMHG | DIASTOLIC BLOOD PRESSURE: 75 MMHG

## 2021-11-17 VITALS — SYSTOLIC BLOOD PRESSURE: 135 MMHG | DIASTOLIC BLOOD PRESSURE: 87 MMHG

## 2021-11-17 VITALS — DIASTOLIC BLOOD PRESSURE: 60 MMHG | SYSTOLIC BLOOD PRESSURE: 105 MMHG

## 2021-11-17 VITALS — SYSTOLIC BLOOD PRESSURE: 148 MMHG | DIASTOLIC BLOOD PRESSURE: 91 MMHG

## 2021-11-17 DIAGNOSIS — G89.29: ICD-10-CM

## 2021-11-17 DIAGNOSIS — Z79.899: ICD-10-CM

## 2021-11-17 DIAGNOSIS — G47.33: ICD-10-CM

## 2021-11-17 DIAGNOSIS — I10: ICD-10-CM

## 2021-11-17 DIAGNOSIS — E66.01: ICD-10-CM

## 2021-11-17 DIAGNOSIS — M54.9: ICD-10-CM

## 2021-11-17 DIAGNOSIS — F32.A: ICD-10-CM

## 2021-11-17 DIAGNOSIS — Z79.1: ICD-10-CM

## 2021-11-17 DIAGNOSIS — M94.261: ICD-10-CM

## 2021-11-17 DIAGNOSIS — S83.281A: Primary | ICD-10-CM

## 2021-11-17 DIAGNOSIS — Z98.890: ICD-10-CM

## 2021-11-17 DIAGNOSIS — M23.41: ICD-10-CM

## 2021-11-17 DIAGNOSIS — I42.9: ICD-10-CM

## 2021-11-17 DIAGNOSIS — Z79.891: ICD-10-CM

## 2021-11-17 DIAGNOSIS — G62.9: ICD-10-CM

## 2021-11-17 PROCEDURE — 87081 CULTURE SCREEN ONLY: CPT

## 2021-11-17 NOTE — PHYSICAL THERAPY ORTHO EVAL
PT Orthopedic Evaluation


Type of Surgery


Knee Scope





Prior Level of Function


Current Living Status:  Spouse


Locomotion     (Upon Admit):  Independent


Established Durable Medical Eq:  Crutches





Subjective


Subjective


Patient currently reports pain at 5-6/10 in the right knee.


Entry Into Home:  Stairs With Railing


Steps Into Home:  3


Steps Accessories:  Railing Present





Motor Control


Motor Control:  Motor Control WNL





ROM


ROM:  WFL, except focal deficit





Transfer


SCALE: Activities may be completed with or without assistive devices.





6-Indepedent-patient completes the activity by him/herself with no assistance 

from a helper.


5-Set-up or Clean-up Assistance-helper sets up or cleans up; patient completes 

activity. [place] assists only prior to or  


    following the activity.


4-Supervision or Touching Assistance-helper provides verbal cues and/or to

uching/steadying and/or contact guard assistance as patient completes activity. 

Assistance may be provided   


    throughout the activity or intermittently.


3-Partial/Moderate Assistance-helper does LESS THAN HALF the effort. [place] 

lifts, holds or supports trunk or limbs, but provides less than half the effort.


2-Substantial/Maximal Assistance-helper does MORE THAN HALF the effort. [place] 

lifts or holds trunk or limbs and provides more than half the effort.


0-Eroogmmok-jmneoz does ALL the effort. Patient does none of the effort to 

complete the activity. Or, the assistance of 2 or more helpers is required for 

the patient to complete the  


    activity.


If activity was not attempted, code reason:


7-Patient Refused.


9-Not Applicable-not attempted and the patient did not perform the activity 

before the current illness, exacerbation or injury.


10-Not Attempted due to Environmental Limitations-(lack of equipment, weather 

restraints, etc.).


88-Not Attempted due to Medical Conditions or Safety Concerns.





Gait


Gait Assistive Device:  Crutches


Right Lower Extremity:  Right


Weight Bearing Status RLE:  Weight Bearing/Tolerated


Gait (QC):  5


Distance (QC):  8=990-86 ft


Distance:  50 feet


Gait Level of Assist:  5





Treatment Rendered


Treatment:  Gait Train





Assessment/Goals


Goal Time Frame:  1 Visit


Understands HEP:  Yes





Plan


Treatment Plan:  Discharge





Time


Time In:  1406


Time Out:  1427


Total Billed Treatment Time:  21


Billed Treatment Time


Visit, ELIZABETH Marshall PT                Nov 17, 2021 14:27

## 2021-11-17 NOTE — PROGRESS NOTE-POST OPERATIVE
Post-Operative Progess Note


Surgeon (s)/Assistant (s)


Surgeon


GRISEL SILVA MD


Assistant:  Tom Simon





Pre-Operative Diagnosis


right knee medial meniscus tear and chondromalacia





Post-Operative Diagnosis





right knee laterall meniscus tear and chondromalacia of the lateral tibia


plateau and trochlea and loose body





Procedure & Operative Findings


Date of Procedure


11/17/21


Procedure Performed/Findings


right knee arthroscopic partial lateral menisctomy and chondroplsty of lateral 

tibia plateau and trochlea and loose body removal


Anesthesia Type


GETA





Estimated Blood Loss


Estimated blood loss (mL):  minimal





Specimens/Packing


Specimens Removed


none


Packing:  


none











GRISEL SILVA MD            Nov 17, 2021 10:45

## 2021-11-17 NOTE — PROGRESS NOTE-PRE OPERATIVE
Pre-Operative Progress Note


H&P Reviewed


The H&P was reviewed, patient examined and no changes noted.


Date Seen by Provider:  Nov 17, 2021


Time Seen by Provider:  10:43


Date H&P Reviewed:  Nov 17, 2021


Time H&P Reviewed:  10:44


Pre-Operative Diagnosis:  right knee medial meniscus tear and chondromalacia











GRISEL SILVA MD            Nov 17, 2021 10:44

## 2021-11-17 NOTE — ANESTHESIA-GENERAL POST-OP
General


Patient Condition


Mental Status/LOC:  Same as Preop


Cardiovascular:  Satisfactory


Nausea/Vomiting:  Absent


Respiratory:  Satisfactory


Pain:  Controlled


Complications:  Absent





Post Op Complications


Complications


None





Follow Up Care/Instructions


Patient Instructions


None needed.





Anesthesia/Patient Condition


Patient Condition


Patient is doing well, no complaints, stable vital signs, no apparent adverse 

anesthesia problems.   


No complications reported per nursing.











JUSTA HART CRNA          Nov 17, 2021 13:13

## 2021-11-17 NOTE — OPERATIVE REPORT
DATE OF SERVICE:  11/17/2021



PREOPERATIVE DIAGNOSIS:

Right knee lateral meniscus tear.



POSTOPERATIVE DIAGNOSES:

1.  Right knee lateral meniscus tear.

2.  Right knee loose body (1 x 1 cm in size).

3.  Right knee chondromalacia of the lateral tibial plateau.

4.  Right knee chondromalacia of the trochlea.



PROCEDURES:

1.  Right knee arthroscopic partial lateral meniscectomy.

2.  Right knee arthroscopic loose body removal (1 x 1 cm in size).

3.  Right knee arthroscopic chondroplasty of the lateral tibial plateau.

4.  Right knee arthroscopic chondroplasty of the trochlea.



SURGEON:

Andrew Garnica MD



ASSISTANT:

Tom Simon, who assisted throughout the procedure and closed the incisions.



ANESTHESIA:

General endotracheal by Ubaldo Hyatt CRNA.



TOURNIQUET TIME:

Not applicable.



ESTIMATED BLOOD LOSS:

Minimal.



DRAINS:

None.



COMPLICATIONS:

None.



POSTOPERATIVE PLAN:

Routine arthroscopy protocol.  The patient was transferred to the recovery room

awake and in stable condition.



STATEMENT OF MEDICAL NECESSITY:

The patient is a 51-year-old gentleman with complaints of right knee pain,

catching, locking and swelling.  He had tenderness along the lateral joint line,

has pain laterally with Vitaliy's.  He tried rest, activity modifications, and

anti-inflammatories without relief.  Due to functional impairment and failure to

improve with conservative measures, the patient elected to proceed with surgical

intervention.  Examination under anesthesia revealed range of motion of 0/0/130

with negative Lachman, negative anterior and posterior drawer.  No varus valgus

laxity, negative pivot shift.  Arthroscopic findings, the patella demonstrated

diffuse grade II chondral softening.  The trochlea demonstrated grade II

chondral flaps centrally in a 10 x 10 area.  The medial and lateral gutters were

clear.  The ACL and PCL were intact.  The medial compartment demonstrated no

meniscal or chondral pathology.  The lateral compartment demonstrated a 1 x 1 cm

loose body posterolaterally.  In addition, there was a radial tear of the

posterior horn/body junction of the meniscus involving approximately 20% of the

junction.  The lateral tibial plateau demonstrated grade IV chondral flap

centrally in an 8 x 8 area.



PROCEDURE IN DETAIL:

After risks and benefits of procedure were discussed and questions were

answered, an informed consent was signed and placed on the chart.  The operative

site was confirmed in the preoperative holding area initialed by the surgeon. 

The patient was then transferred to the operating room and after adequate levels

of general endotracheal anesthetic were obtained, a timeout was called,

confirming the operative site and examination under anesthesia was performed

with above findings noted.  The right lower extremity was prepped and draped in

the usual sterile fashion.  The knee joint was injected with 60 mL fluid and

standard inferolateral portal was placed for the arthroscope under direct

visualization and inferior medial portal was created, the menisci and cruciates

were carefully probed with the above findings noted.  The loose body was removed

with a grasper.  The unstable chondral flaps on the lateral tibial plateau were

debrided with a shaver back to a stable edge and lateral meniscus tear was

debrided with a biter and a shaver back to a stable edge.  Scope was then

redirected into the patellofemoral joint space where the unstable chondral flaps

on the trochlea were debrided with a shaver back to a stable edge.  The knee was

copiously irrigated.  Portal sites were closed with 4-0 nylon in septic fashion.

 Knee was injected with Duramorph.  The port sites were infiltrated with plain

Marcaine.  A soft dressing was applied.  The patient was transferred to the

recovery room awake and in stable condition.





Job ID: [id number]

DocumentID: [id number]

Dictated Date:  11/17/2021 12:19:10

Transcription Date: 11/17/2021 17:50:56

Dictated By: ANDREW GARNICA MD

## 2021-11-22 NOTE — XMS REPORT
Encounter Summary

                             Created on: 2021



Onel Bocanegra

External Reference #: [id number]

: 1970

Sex: Male



Demographics





                          Address                   [address] [place]  [address]

 

                          Home Phone                [phone]

 

                          Preferred Language        English

 

                          Marital Status            

 

                          Restoration Affiliation     Unknown

 

                          Race                      White

 

                          Ethnic Group              Not  or 





Author





                          Author                    [facility]

 

                          Organization              [facility]

 

                          Address                   Unknown

 

                          Phone                     Unavailable







Support





                Name            Relationship    Address         Phone

 

                    Natalie Bocanegra  ECON                [address] [place]  [address]                       [phone]







Care Team Providers





                    Care Team Member Name Role                Phone

 

                    Mary Hong-NP Unavailable         [phone]

 

                    Ana Paula Lockwood RN Unavailable         Unavailable

 

                    Nura Mosqueda MD Unavailable         [phone]

 

                    Herbert Medeiros MD    Unavailable         Unavailable

 

                    Olivier Gomez PA-C PCP                 [phone]







Reason for Visit

* 



 



                           Reason                    Comments

 

 



                                         Medication Refill 









Encounter Details





                          Care Team                 Description



                     Date                Type                Department  

 

                                        



Hue Tian, APRN-NP



[address]



[address]



[place] [address] [phone] (Work)



[phone] (Fax)                       



                     2021          Refill              [facility]

 Rehab  



                                         Med: [facility]  



                                         Pavilion: [id number]  



                                         [address]  



                                         [address]  



                                         [place]  



                                         [address] [phone]  







Social History





                                        Date



                 Tobacco Use     Types           Packs/Day       Years Used 

 

                                         



                     Never Smoker        0                   0 

 

    



                           Smokeless Tobacco:        Chew  



                                         Current User   







                                        Comments: current use of smokless tobacc

o (chew)







                                        Comments



                           Alcohol Use               Standard Drinks/Week 

 

                                         



                           Yes                       8 (1 standard drink = 0.6 o

z pure alcohol) 







  



                     Alcohol Habits      Answer              Date Recorded

 

  



                           How often do you have a drink containing alcohol?  No

t asked 

 

  



                           How many drinks containing alcohol do you have on  No

t asked 



                                         a typical day when you are drinking?  

 

  



                     How often do you have six or more drinks on one  Weekly    

          2020



                                         occasion?  

 

  



                           Comment:                  Not asked 







 



                           Sex Assigned at Birth     Date Recorded

 

 



                           Male                      2020  3:37 PM CDT



documented as of this encounter



Functional Status





                                        Date of Assessment



                           Functional Status         Response 

 

                                        2021



                           Does the patient have a hearing impairment:  No 

 

                                        2021



                           Does the patient have a visual impairment:  Yes 

 

                                        2021



                           Does the patient have impaired ambulation:  Yes 

 

                                        2021



                           Does the patient have an activity of daily living  No

 



                                         (ADL) impairment:  

 

                                        2021



                           Does the patient have an instrumental activity of  Ye

s 



                                         daily living (IADL) impairment:  







                                        Date of Assessment



                           Cognitive Status          Response 

 

                                        2021



                           Does the patient have a cognitive impairment:  No 



documented as of this encounter



Miscellaneous Notes

* Telephone Encounter - Promise Fatima RN - 2021  1:40 PM CST



Formatting of this note might be different from the original.

Last O/V: 10/18/2021

F/U Visit: 2021



Routing to Provider for Review



Electronically signed by Promise Fatima RN at 2021  1:41 PM CST

documented in this encounter



Plan of Treatment





Not on filedocumented as of this encounter



Visit Diagnoses

Not on filedocumented in this encounter



Additional Health Concerns





                                        Noted Time



                                         Assessment 

 

                                        2021 10:16 AM CDT



                                         A fall risk assessment has been complet

ed for the 



                                         patient 



documented as of this encounter



Care Teams





                          Start Date                End Date



                     Team Member         Relationship        Specialty  

 

                          20                   



                     Olivier Gomez PA-C  PCP - General       Physician  



                           [phone] (Work)       Assistant  



                                         [phone] (Fax)    

 

                          14                    



                                         Mary Hong,    



                                         APRN-NP    



                                         [address]    



                                         [address]    



                                         [place] [address]    



                                         [phone] (Work)    



                                         [phone] (Fax)    

 

                          14                    



                                         Ana Paula Lockwood, ASHLEY    

 

                          14                    



                           Nura Mosqueda MD    Urology  



                                          [address]    



                                         [facility] [address]    



                                         [address]    



                                         [place] [address]    



                                         [phone] (Work)    



                                         [phone] (Fax)    

 

                          14                     



                           Herbert Medeiros MD         Urology  



                                         [facility]    



                                         2017    



documented as of this encounter

## 2021-11-22 NOTE — XMS REPORT
Encounter Summary

                             Created on: 2021



Onel Bocanegra

External Reference #: [id number]

: 1970

Sex: Male



Demographics





                          Address                   [address] [place]  [address]

 

                          Home Phone                [phone]

 

                          Preferred Language        English

 

                          Marital Status            

 

                          Catholic Affiliation     Unknown

 

                          Race                      White

 

                          Ethnic Group              Not  or 





Author





                          Author                    [facility]

 

                          Organization              [facility]

 

                          Address                   Unknown

 

                          Phone                     Unavailable







Support





                Name            Relationship    Address         Phone

 

                    Natalie Bocanegra  [employer]                [address]  [address]                       [phone]







Care Team Providers





                    Care Team Member Name Role                Phone

 

                    Mary Hong-NP Unavailable         [phone]

 

                    Ana Paula Lockwood RN Unavailable         Unavailable

 

                    Nura Mosqueda MD Unavailable         [phone]

 

                    Herbert Medeiros MD    Unavailable         Unavailable

 

                    Olivier Gomez PA-C PCP                 [phone]







Reason for Visit

* 



 



                           Reason                    Comments

 

 



                                         Medication Refill 









Encounter Details





                          Care Team                 Description



                     Date                Type                Department  

 

                                        



Hue Tian, APRN-NP



[address]



[address]



[place] [address] [phone] (Work)



[phone] (Fax)                       



                     10/18/2021          Refill              [facility]

 Rehab  



                                         Med: [facility]  



                                         Pavilion: [address]  



                                         [address]  



                                         [address]  



                                         [place]  



                                         [address] [phone]  







Social History





                                        Date



                 Tobacco Use     Types           Packs/Day       Years Used 

 

                                         



                     Never Smoker        0                   0 

 

    



                           Smokeless Tobacco:        Chew  



                                         Current User   







                                        Comments: current use of smokless tobacc

o (chew)







                                        Comments



                           Alcohol Use               Standard Drinks/Week 

 

                                         



                           Yes                       8 (1 standard drink = 0.6 o

z pure alcohol) 







  



                     Alcohol Habits      Answer              Date Recorded

 

  



                           How often do you have a drink containing alcohol?  No

t asked 

 

  



                           How many drinks containing alcohol do you have on  No

t asked 



                                         a typical day when you are drinking?  

 

  



                     How often do you have six or more drinks on one  Weekly    

          2020



                                         occasion?  

 

  



                           Comment:                  Not asked 







 



                           Sex Assigned at Birth     Date Recorded

 

 



                           Male                      2020  3:37 PM CDT







                                        Date Recorded



                           COVID-19 Exposure         Response 

 

                                        10/18/2021  2:45 PM CDT



                           In the last month, have you been in contact with  No 

/ Unsure 



                                         someone who was confirmed or suspected 

to have  



                                         Coronavirus / COVID-19?  



documented as of this encounter



Functional Status





                                        Date of Assessment



                           Functional Status         Response 

 

                                        2021



                           Does the patient have a hearing impairment:  No 

 

                                        2021



                           Does the patient have a visual impairment:  Yes 

 

                                        2021



                           Does the patient have impaired ambulation:  Yes 

 

                                        2021



                           Does the patient have an activity of daily living  No

 



                                         (ADL) impairment:  

 

                                        2021



                           Does the patient have an instrumental activity of  Ye

s 



                                         daily living (IADL) impairment:  







                                        Date of Assessment



                           Cognitive Status          Response 

 

                                        2021



                           Does the patient have a cognitive impairment:  No 



documented as of this encounter



Miscellaneous Notes

* Telephone Encounter - Promise Fatima RN - 10/18/2021  2:49 PM CDT



Formatting of this note might be different from the original.

Last O/V: 10/18/2021

F/U Visit: None



Routing to Provider for Review



Electronically signed by Promise Fatima RN at 10/18/2021  2:50 PM CDT

documented in this encounter



Plan of Treatment





Not on filedocumented as of this encounter



Visit Diagnoses

Not on filedocumented in this encounter



Additional Health Concerns





                                        Noted Time



                                         Assessment 

 

                                        2021 10:16 AM CDT



                                         A fall risk assessment has been complet

ed for the 



                                         patient 



documented as of this encounter



Care Teams





                          Start Date                End Date



                     Team Member         Relationship        Specialty  

 

                          20                   



                     Olivier Gomez PA-C  PCP - General       Physician  



                           [phone] (Work)       Assistant  



                                         [phone] (Fax)    

 

                          14                    



                                         Mary Hong,    



                                         APRN-NP    



                                         [address] [facility] [address]    



                                         [place] [address]    



                                         [phone] (Work)    



                                         [phone] (Fax)    

 

                          14                    



                                         Ana Paula Lockwood, ASHLEY    

 

                          14                    



                           Nura Mosqueda MD    Urology  



                                          [address]    



                                         [facility] [address]    



                                         [address]    



                                         [place] [address]    



                                         [phone] (Work)    



                                         [phone] (Fax)    

 

                          14                     



                           Herbert Medeiros MD         Urology  



                                         [facility]    



                                         2017    



documented as of this encounter

## 2021-11-22 NOTE — XMS REPORT
Clinical Summary

                             Created on: 2021



DaljitOnel

External Reference #: [id number]

: 1970

Sex: Male



Demographics





                          Address                   [address] [place]  [address]

 

                          Home Phone                [phone]

 

                          Preferred Language        English

 

                          Marital Status            

 

                          Roman Catholic Affiliation     Unknown

 

                          Race                      White

 

                          Ethnic Group              Not  or 





Author





                          Author                    [facility]

 

                          Organization              [facility]

 

                          Address                   Unknown

 

                          Phone                     Unavailable







Support





                Name            Relationship    Address         Phone

 

                    Natalie Bocanegra  [employer]                [address] [place]  [address]                       [phone]







Care Team Providers





                    Care Team Member Name Role                Phone

 

                    Mary Hong Unavailable         [phone]

[phone]

 

                    Ana Paula Lockwood RN Unavailable         Unavailable

 

                    Nura Mosqueda MD Unavailable         [phone]

 

                    Herbert Medeiros MD    Unavailable         Unavailable

 

                    Olivier GomezC PCP                 [phone]







Source Comments

Some departments are not documenting in the electronic medical record.  If you d
o not see the information that you expected, contact Release of Information in [facility] Information Management department at [phone] for further assistan
ce in locating additional records.[facility]



Allergies





                                        Comments



                 Active Allergy  Reactions       Severity        Noted Date 

 

                                         



                 Carvedilol      SEE COMMENTS    Low             2016 







Medications





                          End Date                  Status



              Medication   Sig          Dispensed    Refills      Start  



                                         Date  

 

                                                    Active



                 furosemide (LASIX) 40 mg  Take 40 mg by   0                 



                     tablet              mouth daily.        0  

 

                                                    Active



                 pantoprazole DR  Take 40 mg by   0                 



                     (PROTONIX) 40 mg tablet  mouth daily.        0  

 

                                                    Active



                     Verification of Patch    0                   10/01/201  



                           Placement and Integrity -     9  



                                         Lidocaine 5%      

 

                                                    Active



                 amitriptyline (ELAVIL) 50  Take  by        0                 



                     mg tablet           mouth at            7  



                                         bedtime     



                                         daily.     

 

                                                    Active



                 lisinopriL (ZESTRIL) 10  Take  by        0                 



                     mg tablet           mouth daily.        7  

 

                                                    Active



              tiZANidine (ZANAFLEX) 4  Take one-half  90 tablet    3            

  



                     mg tablet           tablet to one       0  



                                         tablet by     



                                         mouth twice     



                                         daily as     



                                         needed.     

 

                                                    Active



                 VRAYLAR 3 mg cap  1.5 mg. 1.5     0                 



                           MG                        0  

 

                                                    Active



                     duloxetine DR (CYMBALTA)  Take 30 mg by       0   



                           30 mg capsule             mouth twice     



                                         daily.     

 

                                                    Active



                 pregabalin (LYRICA) 75 mg  Take 150 mg     0                 



                     capsule             by mouth            1  



                                         twice daily.     

 

                                                    Active



                     vilazodone (VIIBRYD) 20  Take 40 mg by       0   



                           mg tablet                 mouth daily.     

 

                                                    Active



              celecoxib (CELEBREX) 100  TAKE ONE (1)  180 capsule  0            

  



                     mg capsule          CAPSULE BY          1  



                                         MOUTH TWICE     



                                         DAILY     

 

                                                    Active



              HYDROcodone/acetaminophen  Take one     45 tablet    0            

  



                     (NORCO) 10/325 mg tablet  tablet by           1  



                                         mouth every 6     



                                         hours as     



                                         needed for     



                                         Pain     

 

                                                    Active



              HYDROcodone/acetaminophen  Take one     30 tablet    0            

10/18/202  



                     (NORCO) 10/325 mg tablet  tablet by           1  



                                         mouth every     



                                         12 hours as     



                                         needed for     



                                         Pain     







Active Problems





 



                           Problem                   Noted Date

 

 



                           DDD (degenerative disc disease), lumbar  2021

 

 



                           Lumbar polyradiculopathy  2021

 

 



                           Nephrolithiasis           2014







Encounters





                          Care Team                 Description



                     Date                Type                Specialty  

 

                                        



Hue Tian APRN-NP                 



                     2021          Refill              Rehabilitation Medi

cine  

 

                                        



Hue Tian APRN-NP                S/P epidural steroid injection (Primary 

Dx); 

Lumbar radiculopathy; 

Medication management



                     10/18/2021          Office Visit        Rehabilitation Medi

cine  



                                         Telehealth   

 

                                                     



                           10/18/2021                Travel   

 

                                        



Hue Tian APRN-NP                 



                     10/18/2021          Refill              Rehabilitation Medi

cine  

 

                                        



Bel Ludwig MD                     



                     2021          Hospital            Radiology  



                                         Encounter   

 

                                        



Bel Ludwig MD                     



                     2021          Hospital            Anesthesia Pain  



                                         Encounter   

 

                                                     



                           2021                Travel   

 

                                        



Lian Diaz MD                  Lumbar polyradiculopathy (Primary Dx); 

DDD (degenerative disc disease), lumbar



                     2021          Office Visit        Rehabilitation Medi

cine  

 

                                                     



                           2021                Travel   

 

                                        



Hue Tian APRN-NP                Lumbar radiculopathy (Primary Dx); 

Chronic right-sided low back pain without sciatica; 

Medication management; 

S/P epidural steroid injection



                     2021          Office Visit        Rehabilitation Medi

cine  

 

                                                     



                           2021                Travel   

 

                                        



Hue Tian APRN-NP                 



                     2021          Refill              Rehabilitation [facility]  



from Last 3 Months



Immunizations





  



                     Name                Administration Dates  Next Due

 

  



                           COVID-19 (AUGUSTIN &       2021 



                                         AUGUSTIN/ASHTYN)  



                                         Recombinant Protein VACC,  



                                         0.5 mL (PF)  

 

  



                           Flu vaccine, inj          10/24/2019 



                                         unspecified (Historical)  

 

  



                           MMR Vaccine               2008 







Surgical History





   



                 Surgery         Date            Site/Laterality  Comments

 

   



                           ORIF HUMERUS              left arm



                                         DECOMPRESSION   

 

   



                                         GASTRIC BYPASS   

 

   



                                         CHOLECYSTECTOMY   

 

   



                                         URETEROSCOPY   







Medical History





  



                     Medical History     Date                Comments

 

  



                                         Depression  

 

  



                           Deafness                  left ear

 

  



                                         Obesity  

 

  



                                         Urolithiasis  

 

  



                           Anxiety                   1995 

 

  



                           Joint pain                1999 

 

  



                                         Nerve injury  

 

  



                                         Varicose veins  

 

  



                                         Degenerative disc disease, cervical  

 

  



                                         Degenerative disc disease, lumbar  

 

  



                                         Degenerative disc disease, thoracic  

 

  



                           Kidney stones             2014 

 

  



                                         Cardiomyopathy (HCC)  







Family History





   



                 Medical History  Relation        Name            Comments

 

   



                     Heart problem       Father              Gian 

 

   



                 Bleeding Disorders  Mother          Al           HX blood cl

ots

 

   



                     Heart problem       Mother              Al 

 

   



                     Osteoporosis        Mother              Al 







   



                 Relation        Name            Status          Comments

 

   



                           Father                    Gian  

 

   



                           Mother                    Al  







Social History





                                        Date



                 Tobacco Use     Types           Packs/Day       Years Used 

 

                                         



                     Never Smoker        0                   0 

 

    



                           Smokeless Tobacco:        Chew  



                                         Current User   







                                        Comments: current use of smokless tobacc

o (chew)







                                        Comments



                           Alcohol Use               Standard Drinks/Week 

 

                                         



                           Yes                       8 (1 standard drink = 0.6 o

z pure alcohol) 







  



                     Alcohol Habits      Answer              Date Recorded

 

  



                           How often do you have a drink containing alcohol?  No

t asked 

 

  



                           How many drinks containing alcohol do you have on  No

t asked 



                                         a typical day when you are drinking?  

 

  



                     How often do you have six or more drinks on one  Weekly    

          2020



                                         occasion?  

 

  



                           Comment:                  Not asked 







 



                           Sex Assigned at Birth     Date Recorded

 

 



                           Male                      2020  3:37 PM CDT







Last Filed Vital Signs





                    Reading             Time Taken          Comments



                                         Vital Sign   

 

                    121/73              2021  1:45 PM CDT  



                                         Blood Pressure   

 

                    97                  2021  1:31 PM CDT  



                                         Pulse   

 

                    36.6 C (97.9 F) 2021 12:15 PM CDT  



                                         Temperature   

 

                    16                  2021  1:25 PM CDT  



                                         Respiratory Rate   

 

                    100%                2021  1:45 PM CDT  



                                         Oxygen Saturation   

 

                    -                   -                    



                                         Inhaled Oxygen   



                                         Concentration   

 

                    194.6 kg (429 lb)   10/18/2021  2:43 PM CDT  



                                         Weight   

 

                    198.1 cm (6' 6")    10/18/2021  2:43 PM CDT  



                                         Height   

 

                    49.58               10/18/2021  2:43 PM CDT  



                                         Body Mass Index   







Plan of Treatment





   



                 Health Maintenance  Due Date        Last Done       Comments

 

   



                           HIV SCREENING             1987  

 

   



                           DTAP/TDAP VACCINES ( -   1990  



                                         Tdap)   

 

   



                           HEPATITIS C SCREENING     1990  

 

   



                           PHYSICAL (COMPREHENSIVE)  1990  



                                         EXAM   

 

   



                     COVID-19 VACCINE (2 -  2021 



                                         Booster for Ashtyn   



                                         series)   

 

   



                     INFLUENZA VACCINE   2021          10/24/2019 







Procedures





                                        Comments



                 Procedure Name  Priority        Date/Time       Associated Diag

nosis 

 

                                        



Results for this procedure are in the results section.



                     FLUORO GUIDANCE FOR SPINE  Routine             2021  



                           INJ RAD                   1:36 PM CDT  

 

                                        



Results for this procedure are in the results section.



                 NE NJX AA&/STRD TFRML EPI  Routine         2021      Lumb

ar radiculopathy 



                           LUMBAR/SACRAL 1 LEVEL     12:03 PM CDT  

 

                                        



Results for this procedure are in the results section.



                 NE NERVE CONDUCTION  Routine         2021      DDD (degen

erative disc 



                     STUDIES 3-4 STUDIES   1:30 PM CDT         disease), lumbar 



                                         Lumbar radicular pain 



                                         Chronic midline low back 



                                         pain with sciatica, 



                                         sciatica laterality 



                                         unspecified 



                                         Tobacco use disorder 



                                         BMI 45.0-49.9, adult 



                                         (HCC) 



                                         Foraminal stenosis of 



                                         lumbar region 

 

                                        



Results for this procedure are in the results section.



                 NE NDL EMG 1 XTR W/WO  Routine         2021      DDD (deg

enerative disc 



                     RELATED PARASPINAL AREAS   1:30 PM CDT         disease), mihai

mbar 



                                         Lumbar radicular pain 



                                         Chronic midline low back 



                                         pain with sciatica, 



                                         sciatica laterality 



                                         unspecified 



                                         Tobacco use disorder 



                                         BMI 45.0-49.9, adult 



                                         (HCC) 



                                         Foraminal stenosis of 



                                         lumbar region 



from Last 3 Months



Results

* FLUORO GUIDANCE FOR SPINE INJ RAD (2021  1:36 PM CDT)



                                        Modality



                           Anatomical Region         Laterality 

 

                                        Radio Fluoroscopy



                                         Spine  











                                         Specimen

 









                                        Narrative

 

                                        



[facility] - 2021  2:13 PM CDT



This order has been auto finalized and does not contain a result.







   



                 Performing Organization  Address         City/State/ZIP Code  P

melvin Number

 

   



                                         [facility]   





* NE NJX AA&/STRD TFRML EPI LUMBAR/SACRAL 1 LEVEL (2021 12:03 PM CDT)



                                        Narrative

 

                                        



OTHER OUT[facility]IDE LAB - 2021 12:03 PM CDT



Bel Ludwig MD   2021 3:26 PM

Selective Nerve Rootblock/Transforaminal Lumbar/Sacral

Procedure: transforaminal epidural

 

Laterality: right

 on 2021 12:03 PM

Location: lumbar - L4-5

 

 

Consent: 

Consent obtained: verbal and written

Consent given by: patient

Risks discussed: allergic reaction, bleeding, bruising, infection, nerve 

damage, no change or worsening in pain and reaction to medication

 

Discussed with patient the purpose of the treatment/procedure, other ways 

of treating my condition, including no treatment/ procedure and the risks 

and benefits of the alternatives. Patient has decided to proceed with 

treatment/procedure.  

 

Universal Protocol:

Relevant documents: relevant documents present and verified

Test results: test results available and properly labeled

Imaging studies: imaging studies available

Required items: required blood products, implants, devices, and special 

equipment available

Site marked: the operative site was marked

Patient identity confirmed: Patient identify confirmed verbally with 

patient.  

 

Time out: Immediately prior to procedure a "time out" was called to verify 

the correct patient, procedure, equipment, support staff and site/side 

marked as required 

 

Procedures Details: 

Indications: pain and diagnostic evaluation 

Prep: chlorhexidine

Patient position: prone

Estimated Blood Loss: minimal

Specimens: none

Number of Joints: 1

Guidance: fluoroscopy

Contrast: Procedure confirmed with contrast under live fluoroscopy.

Needle and Epidural Catheter: quincke

Needle size: 25 G

Injection procedure: Incremental injection

Patient tolerance: Patient tolerated the procedure well with no immediate 

complications. Pressure was applied, and hemostasis was accomplished.

Comments: DESCRIPTION OF PROCEDURE: The procedure risks and benefits were 

explained to the patient. Informed consent was obtained. The patient was 

placed in the prone position on the fluoroscopy table with a pillow under 

the abdomen to help reduce lumbar lordosis. Blood pressure cuff and 

oxygen saturation monitor were attached and the patient was monitored 

throughout the entire procedure. The L3 vertebral body was identified 

with the use of fluoroscopy in the AP view; the C-arm was obliqued to 

obtain a Ortiz view. The right L3 pedicle was visualized. The skin 

was prepped using Chlorhexadine and draped in aseptic fashion. The C-arm 

was rotated slightly obliquely towards the left side to visualize the area 

just below the foramen. Skin and subcutaneous tissue were anesthetized 

using 3 mL of 1 percent lidocaine with a 27-gauge, 1-1/2 inch needle. 

Next, a 4.69-inch, 25-gauge spinal needle was slowly advanced to the 6 

o'clock position to the right of the L3 pedicle just cephalad to the 

superior articular process. The latter part of the needle advancement was 

performed with the C-arm in the lateral view. When the needle tip was 

visualized to be in the right L3 neural foramen, the C-arm was changed 

back to AP view. Then, 0.6 mL of contrast dye was injected. There was 

spread of dye revealing right L4 nerve root. After negative aspiration, a 

                                        2 mL solution containing 7.5 mg of dexam

ethasone and 0.5 mL of 1 percent 

lidocaine was injected in increments. The stylet was reinserted and then 

removed. After the procedure, the patient's blood pressure, heart rate, 

oxygen saturation, and VAS pain score were recorded in the chart.  

 

There were no complications. The patient tolerated the procedure well and 

was brought to the room for observation in stable condition and discharged 

with written discharge instructions. 

 

PLAN OF CARE: The patient is to follow up in the interventional spine 

clinic in 3 weeks. 

 

The patient was advised to contact the interventional spine center for any 

of the following: 

Fever, chills, or night sweats.

New onset severe sharp pain.

Any new upper or lower extremity weakness or numbness.

Any questions regarding the procedure. 

 

If unable to contact the interventional spine center, the patient was 

instructed to go to the local emergency room. 

 

 







   



                 Performing Organization  Address         City/State/ZIP Code  P

melvin Number

 

   



                                         [facility]   





* NE NDL EMG 1 XTR W/WO RELATED PARASPINAL AREAS, NE NERVE CONDUCTION STUDIES 3-
  4 STUDIES (2021  1:30 PM CDT)



                                        Narrative

 

                                        



[facility] LAB - 2021  1:30 PM CDT



This result has an attachment that is not available.



Lian Diaz MD   2021 2:08 PM

Electrodiagnostic Laboratory Report

 

Impression:

ABNORMAL

 

                                        1. There is electrodiagnostic evidence o

f acute on chronic right 

L4-S1 polyradiculopathy.

                                        2. There is no evidence of a right tibia

l or fibular 

mononeuropathy.

                                        3. There is no evidence of peripheral po

lyneuropathy. 

 

Clinical Correlation:

Follow-up with referring physician. Given the progression of the 

radiculopathy since last EMG/NCS dated 2020, repeat imaging 

may be warranted.

 

Thank you for allowing me to perform electrodiagnostic testing on 

your patients. A full EMG/NCS report will be scanned into 

[facility], including waveforms. If you have any further questions 

or comments, please do not hesitate to call.

 

Lian Diaz MD

Diplomate, American Board of Physical Medicine and Rehabilitation

Diplomate, American Association of Neuromuscular and 

Electrodiagnostic Medicine

 







                                        Procedure Note

 

                                        



Lian Diaz MD - 2021  1:30 PM CDT



Formatting of this note might be different from the original.

Electrodiagnostic Laboratory Report



Impression:

ABNORMAL



                                        1. There is electrodiagnostic evidence o

f acute on chronic right L4-S1 

polyradiculopathy.

                                        2. There is no evidence of a right tibia

l or fibular mononeuropathy.

                                        3. There is no evidence of peripheral po

lyneuropathy. 



Clinical Correlation:

Follow-up with referring physician.  Given the progression of the radiculopathy 
since last EMG/NCS dated 2020, repeat imaging may be warranted.



Thank you for allowing me to perform electrodiagnostic testing on your patients.
 A full EMG/NCS report will be scanned into [employer], including waveforms.  If 
you have any further questions or comments, please do not hesitate to call.



Lian Diaz MD

Diplomate, American Board of Physical Medicine and Rehabilitation

Diplomate, American Association of Neuromuscular and Electrodiagnostic Medicine









   



                 Performing Organization  Address         City/State/ZIP Code  P

melvin Number

 

   



                                         [facility]   





from Last 3 Months



Insurance





                                        Type



            Payer      Benefit    Subscriber ID  Effective  Phone      Address 



                           Plan /                    Dates   



                                         Group     

 

                                        PPO



            [employer] [facility]    [id number]  2021-1  [phone]  [phone]

 [facility]                      [place] 



                                         [address] 

 

                                        HMO



            [employer]  [id number]  2020-P  [phone]  [address] 



                     [employer]               resent              [address] 



                                         [place] 



                                         [address] 







     



            Guarantor Name  Account    Relation to  Date of    Phone      Billin

g Address



                     Type                Patient             Birth  

 

     



            Onel Bocanegra  Personal/F  Self       1970  [phone]  [phone]



                     [address]               (Home)              [place] [address]

05







Advance Directives





                          Patient Representative    Explanation



                           Type                      Date Recorded  

 

                                                     



                                         Advance   



                                         Directive/DPOA   











                          Date Inactivated          Comments



                           Code Status               Date Activated  

 

                          2014  5:23 PM         



                           Full Code                 2014 10:26 PM  







  



                           Provider has discussed Code Status  No, more discussi

on 



                           w/Patient or Family?      needed 







Care Teams





                          Start Date                End Date



                     Team Member         Relationship        Specialty  

 

                          20                   



                     Olivier Gomez PA-C  PCP - General       Physician  



                           [phone] (Work)       Assistant  



                                         [phone] (Fax)    

 

                          14                    



                                         Mary Hong,    



                                         APRN-NP    



                                         [address]    



                                         [place], [place] [id number] [phone] (Work)    



                                         [phone] (Fax)    

 

                          14                    



                                         Ana Paula Lockwood, RN    

 

                          14                    



                           Nura Mosqueda MD    Urology  



                                          [facility]    



                                         [facility] [address]    



                                         [address]    



                                         [place], [place] [id number] [phone] (Work)    



                                         [phone] (Fax)    

 

                          14                     



                           Herbert Medeiros MD         Urology  



                                         LEFT     



                                         2017

## 2022-12-13 NOTE — HISTORY AND PHYSICAL
DATE OF SERVICE: 12/21/2022



This will be for outpatient surgery on 12/21/2022 for right knee arthroscopy.



HISTORY OF PRESENT ILLNESS:

A 52-year-old gentleman who had previously undergone right knee arthroscopy and 

has been doing well until he twisted and since then has had catching and locking

in his knee.  He reports pain on the medial aspect of his knee.  He reports that

it locks and catches.  He reports underwent an injection, which provided only 

temporary relief of his symptoms.  Due to continued mechanical symptoms, the 

patient elected to proceed with surgical intervention.



REVIEW OF SYSTEMS:

No chest pain, no shortness of breath.  No dysuria.



PAST MEDICAL HISTORY:

Lumbar pain, depression, herpes, kidney stones, morbid obesity, cardiomyopathy, 

obstructive sleep apnea, ear infections, varicose veins, neuropathy, hearing 

loss, ventral hernia.



PAST SURGICAL HISTORY:

ORIF of the left elbow, gastric bypass, kidney stone removal, cholecystectomy, 

left knee arthroscopy, right knee arthroscopy.



FAMILY HISTORY:

Significant for chronic obstructive pulmonary disease, Crohn's disease, coronary

artery disease, rheumatoid arthritis, hypertension, diabetes mellitus.



Primary care provider is [facility].



MEDICATIONS:

Zanaflex, Lyrica, Sildenafil, furosemide, Vraylar, pantoprazole, lisinopril, 

Celebrex, Viibryd.



ALLERGIES:

COREG.



SOCIAL HISTORY:

PHYSICAL EXAMINATION:  The patient drinks alcohol socially.  Denies tobacco use 

The patient is well-developed, well-nourished, in no acute distress.

HEENT:  Normocephalic, atraumatic.  Pupils are equal, round and reactive to 

light.  Oropharynx is clear.

NECK:  Supple.  No lymphadenopathy.

LUNGS:  Clear to auscultation bilaterally.

HEART:  Regular rate and rhythm.

ABDOMEN:  Soft, nontender, nondistended.

EXTREMITIES:  The right knee demonstrates marked tenderness along the medial 

joint line.  He has a positive Vitaliy's with an audible click noted and pain 

elicited.  He has a moderate effusion.  He is ambulating with an antalgic gait. 

There is no varus or valgus laxity.  Negative anterior and posterior drawer.  

Active range of motion 0/3/125.



IMPRESSION:

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



PLAN:

Right knee arthroscopy with chondroplasty, partial medial meniscectomy.  The 

risks, benefits, options were complications and recovery were discussed at 

length with the patient.  He understands and wishes to proceed.  This will be 

for outpatient surgery on 12/21/2022.





Job ID: [id number]

DocumentID: [id number]

Dictated Date: 12/09/2022 12:21:34

Transcription Date: 12/09/2022 14:07:00

Dictated By: GRISEL SILVA MD

## 2022-12-14 ENCOUNTER — HOSPITAL ENCOUNTER (OUTPATIENT)
Dept: HOSPITAL 75 - PREOP | Age: 52
End: 2022-12-14
Attending: ORTHOPAEDIC SURGERY
Payer: COMMERCIAL

## 2022-12-14 VITALS — BODY MASS INDEX: 36.45 KG/M2 | WEIGHT: 315 LBS | HEIGHT: 77.99 IN

## 2022-12-14 DIAGNOSIS — Z01.818: Primary | ICD-10-CM

## 2022-12-14 DIAGNOSIS — S83.241A: ICD-10-CM

## 2022-12-14 DIAGNOSIS — M94.261: ICD-10-CM
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
## 2022-12-21 ENCOUNTER — HOSPITAL ENCOUNTER (OUTPATIENT)
Dept: HOSPITAL 75 - SDC | Age: 52
Discharge: HOME | End: 2022-12-21
Attending: ORTHOPAEDIC SURGERY
Payer: COMMERCIAL

## 2022-12-21 VITALS — DIASTOLIC BLOOD PRESSURE: 63 MMHG | SYSTOLIC BLOOD PRESSURE: 102 MMHG

## 2022-12-21 VITALS — SYSTOLIC BLOOD PRESSURE: 113 MMHG | DIASTOLIC BLOOD PRESSURE: 68 MMHG

## 2022-12-21 VITALS — DIASTOLIC BLOOD PRESSURE: 66 MMHG | SYSTOLIC BLOOD PRESSURE: 108 MMHG

## 2022-12-21 VITALS — SYSTOLIC BLOOD PRESSURE: 112 MMHG | DIASTOLIC BLOOD PRESSURE: 56 MMHG

## 2022-12-21 VITALS — SYSTOLIC BLOOD PRESSURE: 85 MMHG | DIASTOLIC BLOOD PRESSURE: 45 MMHG

## 2022-12-21 VITALS — BODY MASS INDEX: 36.45 KG/M2 | WEIGHT: 315 LBS | HEIGHT: 77.95 IN

## 2022-12-21 VITALS — DIASTOLIC BLOOD PRESSURE: 46 MMHG | SYSTOLIC BLOOD PRESSURE: 90 MMHG

## 2022-12-21 VITALS — SYSTOLIC BLOOD PRESSURE: 86 MMHG | DIASTOLIC BLOOD PRESSURE: 41 MMHG

## 2022-12-21 VITALS — SYSTOLIC BLOOD PRESSURE: 102 MMHG | DIASTOLIC BLOOD PRESSURE: 53 MMHG

## 2022-12-21 VITALS — DIASTOLIC BLOOD PRESSURE: 56 MMHG | SYSTOLIC BLOOD PRESSURE: 108 MMHG
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
## 2022-12-21 DIAGNOSIS — M94.261: ICD-10-CM

## 2022-12-21 DIAGNOSIS — S83.241A: Primary | ICD-10-CM

## 2022-12-21 DIAGNOSIS — E66.01: ICD-10-CM

## 2022-12-21 PROCEDURE — 87081 CULTURE SCREEN ONLY: CPT

## 2022-12-21 NOTE — PROGRESS NOTE-POST OPERATIVE
Post-Operative Progess Note


Surgeon (s)/Assistant (s)


Surgeon


GRISEL SILVA MD


Assistant:  Tom Simon





Pre-Operative Diagnosis


right knee  chondromalacia and medial meniscus tear





Post-Operative Diagnosis





right knee  chondromalacia of the lateral femoral condyle and lateral


tibial plateau  and medial meniscus tear





Procedure & Operative Findings


Date of Procedure


12/21/22


Procedure Performed/Findings


right knee arthroscopic chondroplasty of the lateral femoral condyle and lateral

tibial plateau and partial medial meniscectomy


Anesthesia Type


GETA





Estimated Blood Loss


Estimated blood loss (mL):  minimal





Specimens/Packing


Specimens Removed


none


Packing:  


none











GRISEL SILVA MD            Dec 21, 2022 09:07

## 2022-12-21 NOTE — ANESTHESIA-GENERAL POST-OP
General


Patient Condition


Mental Status/LOC:  Same as Preop


Cardiovascular:  Satisfactory


Nausea/Vomiting:  Absent


Respiratory:  Satisfactory


Pain:  Controlled


Complications:  Absent





Post Op Complications


Complications


None





Follow Up Care/Instructions


Patient Instructions


None needed.





Anesthesia/Patient Condition


Patient Condition


Patient is doing well, no complaints, stable vital signs, no apparent adverse 

anesthesia problems.   


No complications reported per nursing.











JUSTA HART CRNA          Dec 21, 2022 11:55

## 2022-12-21 NOTE — PROGRESS NOTE-PRE OPERATIVE
Pre-Operative Progress Note


Date of Available H&P:  Dec 9, 2022


Date H&P Reviewed:  Dec 21, 2022


Time H&P Reviewed:  07:11


Changes from last HP


none


Pre-Operative Diagnosis:  right knee  chondromalacia and medial meniscus tear











GRISEL SILVA MD            Dec 21, 2022 09:06

## 2022-12-21 NOTE — OPERATIVE REPORT
DATE OF SERVICE: 12/21/2022



PREOPERATIVE DIAGNOSIS:

Right knee medial meniscus tear.



POSTOPERATIVE DIAGNOSES:

1.  Right knee medial meniscus tear.

2.  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

3.  Right knee chondromalacia of lateral tibial plateau.



PROCEDURES:

1.  Right knee arthroscopic partial medial meniscectomy.

2.  Right knee arthroscopic chondroplasty of lateral femoral condyle.

3.  Right knee arthroscopic chondroplasty of the lateral tibial plateau.



SURGEON:

Andrew Silva MD



ASSISTANT:

Tom Simon, who assisted throughout the procedure and closed the incisions.



ANESTHESIA:

General endotracheal by Ubaldo Peraza CRNA.



TOURNIQUET TIME: _____



ESTIMATED BLOOD LOSS:

Minimal.



DRAINS:

None.



COMPLICATIONS:

None.



POSTOPERATIVE PLAN:

Routine arthroscopy protocol.  The patient was transferred to recovery room 

awake and in stable condition.



STATEMENT OF MEDICAL NECESSITY:  The patient is a 52-year-old gentleman who had 

previously undergone right knee arthroscopy and had been doing well till it 

twisted and heard a pop.  Since has been having anterior medial knee pain, 

catching and locking.  He had tried rest, activity modifications, and 

anti-inflammatories without relief.  Due to functional impairment and failure to

improve with conservative measures, the patient elected to proceed with surgical

intervention.  Examination under anesthesia revealed range of motion of 0/0/120 

with negative Lachman, negative anterior and posterior drawer.  No varus or 

valgus flexion, negative pivot shift.  Arthroscopic findings demonstrated a 

grade IV chondral loss of the central portion of the patella in a 20 x 20 area 

with no unstable chondral flaps.  Trochlea demonstrated no gross chondral 

abnormalities, but diffuse grade III chondral loss.  The medial and lateral 

gutters were clear.  The ACL and PCL were intact.  The medial compartment 

demonstrated a tear of the anterior horn of the medial meniscus _____ 

approximately 30% in the anterior horn.  No significant chondral pathology was 

noted.  The lateral compartment demonstrated a grade IV chondral flap in the 

anterior aspect of the femoral condyle in a 10 x 10 area and a grade IV chondral

flap in the central portion of the tibial plateau in an 8 x 8 area.  No meniscal

pathology was noted laterally.



DESCRIPTION OF PROCEDURE:

After risks and benefits of procedure were discussed and questions were 

answered, informed consent was signed and placed on the chart.  The operative 

site was confirmed in the preoperative holding area initialed by surgeon.  The 

patient was then transferred to the operating room.  After adequate level of 

general endotracheal anesthetic was obtained, a timeout was called, confirming 

the operative site.  An examination under anesthesia was performed with the 

above findings noted.  The right lower extremity was prepped and draped in the 

usual sterile fashion.  The knee joint was injected with 60 mL of fluid and 

standard inferolateral portal was placed through the arthroscope and under 

direct visualization, an inferomedial portal was created.  The menisci and 

cruciates were carefully probed with the above findings noted.  The unstable 

chondral flaps on the lateral femoral condyle and lateral tibial plateau were 

debrided with the shaver back to a stable edge.  The scope was then redirected 

into the medial compartment where the anterior horn of the medial meniscus was 

debrided with a shaver back to a stable edge.  This was carefully probed with no

further tearing or instability noted.  The knee was copiously irrigated.  Portal

sites were closed with 4-0 nylon in simple interrupted fashion.  Knee was 

injected with Duramorph.  The port sites were infiltrated with plain Marcaine.  

A soft dressing was applied.  The patient was transferred to recovery room awake

and stable condition.





Job ID: [id number]

DocumentID: [id number]

Dictated Date: 12/21/2022 09:44:05

Transcription Date: 12/21/2022 19:50:00

Dictated By: ANDREW SILVA MD